# Patient Record
Sex: FEMALE | Race: WHITE | NOT HISPANIC OR LATINO | Employment: PART TIME | URBAN - METROPOLITAN AREA
[De-identification: names, ages, dates, MRNs, and addresses within clinical notes are randomized per-mention and may not be internally consistent; named-entity substitution may affect disease eponyms.]

---

## 2017-08-14 ENCOUNTER — HISTORICAL (OUTPATIENT)
Dept: RADIOLOGY | Facility: HOSPITAL | Age: 52
End: 2017-08-14

## 2017-08-25 ENCOUNTER — HISTORICAL (OUTPATIENT)
Dept: ADMINISTRATIVE | Facility: HOSPITAL | Age: 52
End: 2017-08-25

## 2017-08-25 LAB
APPEARANCE, UA: CLEAR
BACTERIA #/AREA URNS AUTO: ABNORMAL /[HPF]
BILIRUB UR QL STRIP: NEGATIVE
COLOR UR: YELLOW
GLUCOSE (UA): NORMAL
HGB UR QL STRIP: 0.06 MG/DL
HYALINE CASTS #/AREA URNS LPF: ABNORMAL /[LPF]
KETONES UR QL STRIP: NEGATIVE
LEUKOCYTE ESTERASE UR QL STRIP: NEGATIVE
NITRITE UR QL STRIP: NEGATIVE
PH UR STRIP: 6.5 [PH] (ref 4.5–8)
PROT UR QL STRIP: 30 MG/DL
RBC #/AREA URNS AUTO: ABNORMAL /[HPF]
SP GR UR STRIP: 1.02 (ref 1–1.03)
SQUAMOUS #/AREA URNS LPF: ABNORMAL /[LPF]
UROBILINOGEN UR STRIP-ACNC: 2 MG/DL
WBC #/AREA URNS AUTO: ABNORMAL /HPF

## 2017-08-27 LAB — FINAL CULTURE: NO GROWTH

## 2017-09-01 ENCOUNTER — HISTORICAL (OUTPATIENT)
Dept: ADMINISTRATIVE | Facility: HOSPITAL | Age: 52
End: 2017-09-01

## 2018-09-04 ENCOUNTER — HISTORICAL (OUTPATIENT)
Dept: WOUND CARE | Facility: HOSPITAL | Age: 53
End: 2018-09-04

## 2019-06-28 ENCOUNTER — HISTORICAL (OUTPATIENT)
Dept: WOUND CARE | Facility: HOSPITAL | Age: 54
End: 2019-06-28

## 2019-07-23 ENCOUNTER — HISTORICAL (OUTPATIENT)
Dept: RADIOLOGY | Facility: HOSPITAL | Age: 54
End: 2019-07-23

## 2019-07-30 ENCOUNTER — HISTORICAL (OUTPATIENT)
Dept: RADIOLOGY | Facility: HOSPITAL | Age: 54
End: 2019-07-30

## 2020-04-14 ENCOUNTER — HISTORICAL (OUTPATIENT)
Dept: INTERNAL MEDICINE | Facility: CLINIC | Age: 55
End: 2020-04-14

## 2020-09-22 ENCOUNTER — HISTORICAL (OUTPATIENT)
Dept: RADIOLOGY | Facility: HOSPITAL | Age: 55
End: 2020-09-22

## 2020-12-04 ENCOUNTER — HISTORICAL (OUTPATIENT)
Dept: ADMINISTRATIVE | Facility: HOSPITAL | Age: 55
End: 2020-12-04

## 2020-12-10 ENCOUNTER — HISTORICAL (OUTPATIENT)
Dept: ADMINISTRATIVE | Facility: HOSPITAL | Age: 55
End: 2020-12-10

## 2021-03-29 LAB — CRC RECOMMENDATION EXT: NORMAL

## 2021-09-13 ENCOUNTER — HISTORICAL (OUTPATIENT)
Dept: ADMINISTRATIVE | Facility: HOSPITAL | Age: 56
End: 2021-09-13

## 2021-09-28 ENCOUNTER — HISTORICAL (OUTPATIENT)
Dept: RADIOLOGY | Facility: HOSPITAL | Age: 56
End: 2021-09-28

## 2021-10-07 ENCOUNTER — HISTORICAL (OUTPATIENT)
Dept: LAB | Facility: HOSPITAL | Age: 56
End: 2021-10-07

## 2021-10-07 LAB
ANTINUCLEAR ANTIBODY SCREEN (OHS): NEGATIVE
CRP SERPL-MCNC: 0.49 MG/DL
DSDNA ANTIBODY (OHS): NEGATIVE
ERYTHROCYTE [SEDIMENTATION RATE] IN BLOOD: 7 MM/HR (ref 0–20)

## 2021-10-13 ENCOUNTER — HISTORICAL (OUTPATIENT)
Dept: RADIOLOGY | Facility: HOSPITAL | Age: 56
End: 2021-10-13

## 2021-11-09 ENCOUNTER — HISTORICAL (OUTPATIENT)
Dept: INTERNAL MEDICINE | Facility: CLINIC | Age: 56
End: 2021-11-09

## 2021-11-09 LAB
ABS NEUT (OLG): 2.46 X10(3)/MCL (ref 2.1–9.2)
ALBUMIN SERPL-MCNC: 4.1 GM/DL (ref 3.5–5)
ALBUMIN/GLOB SERPL: 1.4 RATIO (ref 1.1–2)
ALP SERPL-CCNC: 55 UNIT/L (ref 40–150)
ALT SERPL-CCNC: 21 UNIT/L (ref 0–55)
AST SERPL-CCNC: 23 UNIT/L (ref 5–34)
BASOPHILS # BLD AUTO: 0.1 X10(3)/MCL (ref 0–0.2)
BASOPHILS NFR BLD AUTO: 1 %
BILIRUB SERPL-MCNC: 0.8 MG/DL
BILIRUBIN DIRECT+TOT PNL SERPL-MCNC: 0.3 MG/DL (ref 0–0.5)
BILIRUBIN DIRECT+TOT PNL SERPL-MCNC: 0.5 MG/DL (ref 0–0.8)
BUN SERPL-MCNC: 17.5 MG/DL (ref 9.8–20.1)
CALCIUM SERPL-MCNC: 9.8 MG/DL (ref 8.7–10.5)
CHLORIDE SERPL-SCNC: 107 MMOL/L (ref 98–107)
CO2 SERPL-SCNC: 23 MMOL/L (ref 22–29)
CREAT SERPL-MCNC: 0.69 MG/DL (ref 0.55–1.02)
DEPRECATED CALCIDIOL+CALCIFEROL SERPL-MC: 25.7 NG/ML (ref 30–80)
EOSINOPHIL # BLD AUTO: 0.2 X10(3)/MCL (ref 0–0.9)
EOSINOPHIL NFR BLD AUTO: 3 %
ERYTHROCYTE [DISTWIDTH] IN BLOOD BY AUTOMATED COUNT: 12.3 % (ref 11.5–14.5)
GLOBULIN SER-MCNC: 3 GM/DL (ref 2.4–3.5)
GLUCOSE SERPL-MCNC: 94 MG/DL (ref 74–100)
HCT VFR BLD AUTO: 40.9 % (ref 35–46)
HGB BLD-MCNC: 13.6 GM/DL (ref 12–16)
IMM GRANULOCYTES # BLD AUTO: 0.02 10*3/UL
IMM GRANULOCYTES NFR BLD AUTO: 0 %
LYMPHOCYTES # BLD AUTO: 3 X10(3)/MCL (ref 0.6–4.6)
LYMPHOCYTES NFR BLD AUTO: 49 %
MCH RBC QN AUTO: 30.8 PG (ref 26–34)
MCHC RBC AUTO-ENTMCNC: 33.3 GM/DL (ref 31–37)
MCV RBC AUTO: 92.5 FL (ref 80–100)
MONOCYTES # BLD AUTO: 0.4 X10(3)/MCL (ref 0.1–1.3)
MONOCYTES NFR BLD AUTO: 7 %
NEUTROPHILS # BLD AUTO: 2.46 X10(3)/MCL (ref 2.1–9.2)
NEUTROPHILS NFR BLD AUTO: 40 %
NRBC BLD AUTO-RTO: 0 % (ref 0–0.2)
PLATELET # BLD AUTO: 209 X10(3)/MCL (ref 130–400)
PMV BLD AUTO: 10.3 FL (ref 7.4–10.4)
POTASSIUM SERPL-SCNC: 3.8 MMOL/L (ref 3.5–5.1)
PROT SERPL-MCNC: 7.1 GM/DL (ref 6.4–8.3)
RBC # BLD AUTO: 4.42 X10(6)/MCL (ref 4–5.2)
SODIUM SERPL-SCNC: 140 MMOL/L (ref 136–145)
WBC # SPEC AUTO: 6.1 X10(3)/MCL (ref 4.5–11)

## 2022-02-15 ENCOUNTER — HISTORICAL (OUTPATIENT)
Dept: INTERNAL MEDICINE | Facility: CLINIC | Age: 57
End: 2022-02-15

## 2022-02-15 LAB — DEPRECATED CALCIDIOL+CALCIFEROL SERPL-MC: 30.7 NG/ML (ref 30–80)

## 2022-04-09 ENCOUNTER — HISTORICAL (OUTPATIENT)
Dept: ADMINISTRATIVE | Facility: HOSPITAL | Age: 57
End: 2022-04-09
Payer: MEDICAID

## 2022-04-25 VITALS
BODY MASS INDEX: 30.96 KG/M2 | OXYGEN SATURATION: 99 % | WEIGHT: 164 LBS | HEIGHT: 61 IN | SYSTOLIC BLOOD PRESSURE: 117 MMHG | DIASTOLIC BLOOD PRESSURE: 82 MMHG

## 2022-05-02 NOTE — HISTORICAL [facility] CERNER
This is a historical note converted from Albert. Formatting and pictures may have been removed.  Please reference Albert for original formatting and attached multimedia. Chief Complaint  c/o pelvic pain and dysuria  History of Present Illness  Pt is a 53yo here today with c/o pelvic pain. She reports that soon after getting the pelvic u/s on 8/14/17 she began having dysuria, vaginal itching, and lower abdominal pain. She called the clinic on 8/18/17 and was prescribed diflucan for vaginal itching. She reports improvement in dysuria and vaginal itching. She reports urinary frequency and yellow/green vaginal discharge. She also reports unprotected sex around the same time as the u/s.  Review of Systems  Constitutional: No fever, No chills, No sweats, No fatigue.  Gastrointestinal: Positive for lower abdominal pain. No nausea, No vomiting, No abdominal distention, No change in bowel habits.  Genitourinary:?Positive for frequency and?dysuria, No hematuria.  Gynecologic: Positive for vaginal itching and discharge.  Neurologic: Alert and oriented X4, No confusion.  ?  Physical Exam  Vitals & Measurements  T:?36.8? ?C ?(Oral)? HR:?77?(Peripheral)? RR:?18? BP:?116/79?  HT:?154?cm? HT:?154?cm? WT:?75?kg? WT:?75?kg? BMI:?31.62?  General: Alert and oriented, No acute distress.  Gastrointestinal: Soft, lower abdominal tenderness.  Gynecology:  Labia: Bilateral, Majora, Minora, Within normal limits.  Vagina: Within normal limits.  Cervix: scant yellow discharge from os. No cervical motion tenderness, No lesions.  Uterus: Mobile.  Ovaries: No palpable?mass.  Integumentary: Warm, Dry.  Psychiatric: Cooperative, Appropriate mood & affect.  ?  Assessment/Plan  1.?Dysuria  ? - UA  - drink plenty of water  Ordered:  Office/Outpatient Visit Level 3 Established 61780 PC  Urinalysis with Microscopic if Indicated  Urine Culture 04172  Urine Dipstick POC  ?  2.?Pelvic pain  ?-UA  - GC  - wet prep  - naproxen BID PRN  pain  Ordered:  naproxen, 500 mg = 1 tab(s), Oral, BID, # 30 tab(s), 0 Refill(s), Pharmacy: [id number]  Clinic Follow-up PRN  Office/Outpatient Visit Level 3 Established 13396 PC  Urinalysis with Microscopic if Indicated  Urine Culture [id number]  Urine Dipstick POC  ?  3.?Screening for STD (sexually transmitted disease)  ? Declines HIV/hepatitis/syphilis screening.  Ordered:  Chlamydia trach and N. gonorrhea PCR  Office/Outpatient Visit Level 3 Established 78767 PC  Wet Prep Smear  ?   Problem List/Past Medical History  Ongoing  Obesity  Historical  Procedure/Surgical History   delivery ()  None  Medications  naproxen 500 mg oral tablet, 500 mg= 1 tab(s), Oral, BID  Allergies  No Known Allergies  Social History  Alcohol - Denies Alcohol Use, 03/15/2017  Never  Employment/School - 03/15/2017  Work/School description: homemaker. Highest education level: Some college. Operates hazardous equipment: No.  Exercise - 03/15/2017  Self assessment: Good condition.  Home/Environment - 03/15/2017  Lives with daughter lives with her. Living situation: Home/Independent. Alcohol abuse in household: No. Substance abuse in household: No. Smoker in household: No. Injuries/Abuse/Neglect in household: No. Feels unsafe at home: No. Family/Friends available for support: Yes. Concern for family members at home: No. Major illness in household: No. Financial concerns: No. TV/Computer concerns: No.  Nutrition/Health - 03/15/2017  Regular  Substance Abuse - 03/15/2017  Never  Tobacco - Denies Tobacco Use, 03/15/2017  Never smoker  Family History  Lung cancer: Father.

## 2022-05-02 NOTE — HISTORICAL [facility] CERNER
This is a historical note converted from Albert. Formatting and pictures may have been removed.  Please reference Albert for original formatting and attached multimedia. Chief Complaint  F/U- Numbness in legs worsen- Right worse 8/10  History of Present Illness  PMHx: vasomotor symptoms, HLD  ?   This is a 56-year-old  female who presents?for a f/u visit.? LV 12/2020. Patient reports overall doing well except for worsening lower back pain that is radiating in to bilateral buttocks extending to posterior thigh and back of knees.??Reports?pain is worse with certain positional movements, pain is especially worse with sitting and relieved with standing and walking around. At her last visit, she was given Mobic which she reports no relief.?Also?tried gabapentin which?she states?helps but it is not lasting until her next dose.?States?the pain and?tingling do not occur all the time, intermittent. No alleviating factors. No saddle anesthesia, no urinary or bowel incontinence. no other numbness/tingling in any of her other extremities. 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 She also is no longer taking HRT since July 2021.  ?   Denies any neuro changes, ulcers/skin changes, chest pain, sob, n/v abdominal pain. No fevers or chills.? Followed by GYN for dysuria and vasomotor symptoms.?  No changes in diet or activity. ?Reports good energy levels.?  Review of Systems  12 point ROS was performed and?all negative except as stated in the HPI.  Physical Exam  Vitals & Measurements  T:?36.7? ?C (Oral)? HR:?70(Peripheral)? RR:?18? BP:?123/86?  HT:?154.00?cm? WT:?74.900?kg? BMI:?31.58?  General: well-nourished, well-developed in no acute distress  HEENT: Atraumatic, normocephalic.  Neck: No JVD or carotid bruits. No lymphadenopathy.  Heart: RRR, no murmurs, gallops, clicks or rubs. S1, S2 present.?  Lungs: CTAB without rales, wheezes or  rhonchi. Normal work of breathing. Chest rise symmetrical on inspiration.  Abd: Soft, non-tender, non-distended and without guarding. Bowel sounds present.  Extremities: Radial and pedal pulses 2+ bilaterally, no LE edema. Homans sign negative bilaterally  MSK: Moves all extremities purposefully. Straight leg raise positive on the right. Spine and b/l hip non-tender to palpation  Skin: Warm, dry and without rashes.  Neuro: AA&Ox3, no FND, motor strength 5/5 bilaterally  Assessment/Plan  Hx of vasomotor symptoms, no longer on HRT  followed by GYN, last seen by GYN?on 7/2021 - was taken off of all HRT  MG ordered per GYN  ?   Hypercholesterolemia  Repeat Lipid panel 06/2021, , HDL 63  ASCVD risk 1.7% no statin therapy recommended at this time  Continue 6 month surveillance - ordered repeat lipid panel  ?   Chronic lower lumbar radiculopathy  Sciatic nerve pain  - XR lumbar spine done 12/2020 no listhesis, Possible L5-S1 facet overgrowth. Lumbar disc spaces are preserved.  - stopped Mobic given no relief, failed conservative management with stretching/physical therapy, anti-inflammatories and gabapentin  - given persistent and worsening symptoms of lower lumbar pain with radiation to b/l buttocks and posterior thigh  - D-dimer negative at last visit, less concern for VTE  -Ordered MRI lumbar spine, XR pelvis along to evaluate SI joint, ordered ESR, CRP, SORAYA, HLA-B27 to rule out sacroilitis/ankylosing spondylitis  - increased gabapentin to 300mg TID and added Robaxin 750mg BID prn mucsle spasm in the interim  -Sent referral to neurosurgery in [facility]  ?   Health Maintenance:  TDAP: UTD  Zoster: dose #1/2 given today  HIV/Hep screening: negative on 12/2020  CAGE screening:?done ?8.14.20  Influenza vaccine: N/A at this time  Screening colonoscopy: normal colonoscopy on 3/29/2021  Pap smear:?NIL in 2019 (repeat due 07/2022)  Mammogram: Done in July 2019 with?recommended?follow-up with ultrasound?which was also  done in 2019 and was negative for any breast?pathology. MG ordered per GYN  A1C 5.2% in Dec 2020  ?  ?   Labs: ESR, CRP, SORAYA, HLA-B27  Imaging: MRI lumbar spine, xr pelvis  Medications: reconciled, discussed.  RTC in?2 months  Referrals  Ambulatory Referral, Specialty: Neurosurgery, Reason: Lumbar radiculopathy worsening x 2.5 years, no improvement with conservative management, Refer To: Provider Not Specified, [facility], [address], [address], [place], [address]., Start: ...  Clinic Follow up, *Est. 21 7:50:00 CST, Order for future visit, Hypertriglyceridemia, [facility]   Problem List/Past Medical History  Ongoing  Hot flashes due to menopause  Hypertriglyceridemia  Obesity  Historical  Pregnant  Pregnant  Procedure/Surgical History  c/s ()   delivery ()   Medications  gabapentin 100 mg oral capsule, 300 mg= 3 cap(s), Oral, TID, 2 refills  Robaxin 750 mg oral tablet, 750 mg= 1 tab(s), Oral, BID, 1 refills  Allergies  No Known Allergies  Social History  Abuse/Neglect  No, No, Yes, 2021  Alcohol - Denies Alcohol Use, 10/26/2014  Current, Wine, 1-2 times per week, Alcohol use interferes with work or home: No. Others hurt by drinking: No. Household alcohol concerns: No., 2021  Employment/School  Employed, Highest education level: High school., 2021  Exercise  Exercise duration: 30. Exercise frequency: Daily. Exercise type: Walking., 12/10/2019  Home/Environment  Lives with Alone. Living situation: Home/Independent., 12/10/2019  Nutrition/Health  Good, 2021  Sexual  Sexually active: Yes. Number of current partners 1. Sexual orientation: Straight or heterosexual. Gender Identity Identifies as female., 2019  Spiritual/Cultural  Judaism, Yes, 2021  Substance Use  Never, 2017  Tobacco - Denies Tobacco Use, 10/26/2014  Never (less than 100 in lifetime), N/A, 2021  Family History  Lung cancer:  Father.  Immunizations  Vaccine Date Status Comments   influenza virus vaccine, inactivated 12/10/2019 Given    tetanus/diphtheria/pertussis, acel(Tdap) 08/14/2018 Given    influenza virus vaccine, inactivated 03/15/2017 Given    influenza virus vaccine, inactivated 09/15/2013 Recorded    influenza virus vaccine, inactivated 11/08/2011 Recorded 2018-08-13: UNKNOWN CAMPAIGNID   Health Maintenance  Health Maintenance  ???Pending?(in the next year)  ??? ??OverDue  ??? ? ? ?Colorectal Screening due??04/14/21??and every 1??year(s)  ??? ??Due In Future?  ??? ? ? ?ADL Screening not due until??12/10/21??and every 1??year(s)  ??? ? ? ?Obesity Screening not due until??01/01/22??and every 1??year(s)  ??? ? ? ?Alcohol Misuse Screening not due until??01/02/22??and every 1??year(s)  ??? ? ? ?Cervical Cancer Screening not due until??07/07/22??and every 3??year(s)  ???Satisfied?(in the past 1 year)  ??? ??Satisfied?  ??? ? ? ?ADL Screening on??12/10/20.??Satisfied by Akua Arizmendi LPN  ??? ? ? ?Alcohol Misuse Screening on??06/21/21.??Satisfied by Ella Castellanos DO  ??? ? ? ?Aspirin Therapy for CVD Prevention on??09/13/21.??Satisfied by Ella Castellanos DO  ??? ? ? ?Blood Pressure Screening on??09/13/21.??Satisfied by Larisa Deluna LPN  ??? ? ? ?Body Mass Index Check on??09/13/21.??Satisfied by Larisa Deluna LPN  ??? ? ? ?Breast Cancer Screening on??09/22/20.??Satisfied by Katina Fong  ??? ? ? ?Depression Screening on??09/13/21.??Satisfied by Larisa Deluna LPN  ??? ? ? ?Diabetes Screening on??12/04/20.??Satisfied by Queta Campbell  ??? ? ? ?Influenza Vaccine on??12/10/20.??Satisfied by Giorgi Singleton  ??? ? ? ?Lipid Screening on??06/21/21.??Satisfied by Ashley Martin.  ??? ? ? ?Obesity Screening on??09/13/21.??Satisfied by Larisa Deluna LPN  ??? ??Refused?  ??? ? ? ?Influenza Vaccine on??12/10/20.??Recorded by Ella Castellanos DO??Reason: Patient Refuses  ??? ? ? ?Zoster Vaccine  on??06/21/21.??Recorded by Ella Castellanos DO??Reason: Patient Refuses  ?      g

## 2022-05-02 NOTE — HISTORICAL [facility] CERNER
This is a historical note converted from Albert. Formatting and pictures may have been removed.  Please reference Albert for original formatting and attached multimedia. Chief Complaint  f/u visit  History of Present Illness  PMHx: vasomotor symptoms, HLD  ?   This is a 55-year-old  female who presents?for a f/u visit.? LV 10/2020. Patient was notified a few days ago of results of labs which was significant for UA positive nitrites and leuk est.?Patient was complaining of polyuria and suprapubic pain so was given Rx for Macrobid. She has completed 2 days so far. Today she reports Itching, pain with sex, erythema that started on Monday. She denies any?discharge or foul odor. She reports that her?and her one sexual partner split up for a while and are now back together. She is concerned about possible?STDs.  ?   Denies any neuro changes, ulcers/skin changes, chest pain, sob, n/v abdominal pain. No fevers or chills. No blood in urine or stool. She was given referral for colonoscopy at last visit but has not had appt yet due to GI on diversion. Will send external referral today to [facility].  ?   Followed by GYN for dysuria and vasomotor symptoms. Last seen on 7/2020.  No changes in diet or activity. ?Reports good energy levels.?  ?   She?also reports left lower?lumbar pain?with radiation and tingling to the back of the left leg extending down to the knee for about one year now. She is requesting something for pain. States?the pain and?tingling do not occur all the time, intermittent. No alleviating or exacerbating factors. No saddle anesthesia, no urinary or bowel incontinence. no other numbness/tingling in any of her other extremities.  ?  Review of Systems  12 point ROS was performed and?all negative except as stated in the HPI.  Physical Exam  Vitals & Measurements  T:?36.9? ?C (Oral)? HR:?72(Peripheral)? RR:?20? BP:?117/79?  HT:?154.00?cm? WT:?73.400?kg? BMI:?30.95?  General: well-nourished,  well-developed in no acute distress  HEENT: Atraumatic, normocephalic.  Neck: No JVD or carotid bruits. No lymphadenopathy.  Heart: RRR, no murmurs, gallops, clicks or rubs. S1, S2 present.?  Lungs: CTAB without rales, wheezes or rhonchi. Normal work of breathing. Chest rise symmetrical on inspiration.  Abd: Soft, non-tender, non-distended and without guarding. Bowel sounds present.  : slight erythema of vulva, no rash or herptic lesions seen, no discharge, no chuy foul odor  Extremities: Radial and pedal pulses 2+ bilaterally, no LE edema.  MSK: Moves all extremities purposefully.  Skin: Warm, dry and without rashes.  Assessment/Plan  UTI  -Rx for Macrobid given 2 days ago, will complete on Saturday  - Symptoms of polyuria and suprapubic pain improving  - Now with itching, pain with sex, and erythema - exam benign (though pelvic not done due to no equipment in clinic)  - Instructed patient to call her OB/GYN to notify of symptoms and see if she can get an appt today and if NOT, urged patient to go to [facility] for pelvic exam and testing for GC/Chlamydia, BV, trichomonas then call GYN for f/u  - ordered HIV, syphilis  ?   Hx of vasomotor symptoms, on HRT  followed by GYN, last seen by GYN?on 7/10/2020  continue to f/u with them for HRT  ?   Hypercholesterolemia  Repeat Lipid panel in Dec 2020 , HDL 64, trig 98,   ASCVD risk 1.5% no statin therapy recommended at this time  Continue 6 month surveillance.  ?   Sciatic nerve pain  - ordered XR lumbar spine to rule out spondylolisthesis  - will provide Rx for Mobic and start Protonix for ulcer ppx  ?   Health Maintenance:  TDAP: UTD  CAGE screening:?done ?8.14.20  Influenza vaccine: Given Dec 2019. Refused  Screening colonoscopy: FIT positive 04/2020 - sent referral to [facility]  Pap smear: f/u with GYN, MG ordered per GYN note  Mammogram: Done in July 2019 with?recommended?follow-up with ultrasound?which was also done in  2019 and was negative for any breast?pathology. MG ordered per GYN  A1C 5.2% in Dec 2020  ?  ?  Labs today: HIV, syphilis, acute hep panel  Imaging: XR lumbar spine  Medications: reconciled, discussed.  RTC in?3 months  Referrals  Ambulatory Referral, Specialty: Gastroenterology, Reason: Colonoscopy, Refer To: [facility], [facility], [facility], [address] [facility] [address] [place], [address]., Start: 12/10/20 8:14:00 CST, Colon cancer screening  Clinic Follow up, *Est. 21 9:15:00 CDT, Order for future visit, Hot flashes due to menopause, [facility]  Clinic Follow up, *Est. 21 9:15:00 CDT, Order for future visit, Hot flashes due to menopause, [facility]   Problem List/Past Medical History  Ongoing  Hot flashes due to menopause  Hypertriglyceridemia  Obesity  Historical  Pregnant  Pregnant  Procedure/Surgical History  c/s ()   delivery ()   Medications  estradiol 0.5 mg oral tablet, See Instructions, 6 refills,? ?Not taking: Duplicate  estradiol 0.5 mg oral tablet, 0.5 mg= 1 tab(s), Oral, Every other day, 6 refills  Macrobid 100 mg oral capsule, 100 mg= 1 cap(s), Oral, BID,? ?Still taking, not as prescribed: pt states  Mobic 7.5 mg oral tablet, 7.5 mg= 1 tab(s), Oral, Daily, PRN, 1 refills  Protonix 40 mg ORAL enteric coated tablet, 40 mg= 1 tab(s), Oral, Daily, 1 refills  Provera 2.5 mg oral tablet, See Instructions, 6 refills,? ?Not taking: Duplicate  Provera 2.5 mg oral tablet, 2.5 mg= 1 tab(s), Oral, Every other day, 6 refills  Allergies  No Known Allergies  Social History  Abuse/Neglect  No, No, Yes, 10/14/2020  No, 12/10/2019  Alcohol - Denies Alcohol Use, 10/26/2014  Current, Beer, Wine, 12/10/2019  Employment/School  Employed, 12/10/2019  Exercise  Exercise duration: 30. Exercise frequency: Daily. Exercise type: Walking., 12/10/2019  Home/Environment  Lives with Alone. Living situation: Home/Independent.,  12/10/2019  Nutrition/Health  Regular, 12/10/2019  Sexual  Sexually active: Yes. Number of current partners 1. Sexual orientation: Straight or heterosexual. Gender Identity Identifies as female., 07/08/2019  Spiritual/Cultural  [place], 12/10/2019  Substance Use  Never, 02/23/2017  Tobacco - Denies Tobacco Use, 10/26/2014  Never (less than 100 in lifetime), N/A, 10/14/2020  Never (less than 100 in lifetime), N/A, 12/10/2019  Family History  Lung cancer: Father.  Immunizations  Vaccine Date Status Comments   influenza virus vaccine, inactivated 12/10/2019 Given    tetanus/diphtheria/pertussis, acel(Tdap) 08/14/2018 Given    influenza virus vaccine, inactivated 03/15/2017 Given    influenza virus vaccine, inactivated 09/15/2013 Recorded    influenza virus vaccine, inactivated 11/08/2011 Recorded 2018-08-13: UNKNOWN CAMPAIGNID   Health Maintenance  Health Maintenance  ???Pending?(in the next year)  ??? ??OverDue  ??? ? ? ?Influenza Vaccine due??10/01/20??and every 1??day(s)  ??? ??Due?  ??? ? ? ?Zoster Vaccine due??12/10/20??Unknown Frequency  ??? ??Due In Future?  ??? ? ? ?Obesity Screening not due until??01/01/21??and every 1??year(s)  ??? ? ? ?Alcohol Misuse Screening not due until??01/02/21??and every 1??year(s)  ??? ? ? ?Colorectal Screening not due until??04/14/21??and every 1??year(s)  ??? ? ? ?Blood Pressure Screening not due until??07/10/21??and every 1??year(s)  ??? ? ? ?Body Mass Index Check not due until??07/10/21??and every 1??year(s)  ??? ? ? ?Depression Screening not due until??10/14/21??and every 1??year(s)  ??? ? ? ?ADL Screening not due until??10/14/21??and every 1??year(s)  ???Satisfied?(in the past 1 year)  ??? ??Satisfied?  ??? ? ? ?ADL Screening on??10/14/20.??Satisfied by Ella Castellanos DO  ??? ? ? ?Alcohol Misuse Screening on??10/14/20.??Satisfied by Ella Castellanos DO  ??? ? ? ?Blood Pressure Screening on??07/10/20.??Satisfied by Albina Davies LPN  ??? ? ? ?Body Mass Index Check  on??07/10/20.??Satisfied by Albina Davies LPN  ??? ? ? ?Breast Cancer Screening on??09/22/20.??Satisfied by Katina Fong  ??? ? ? ?Colorectal Screening on??04/14/20.??Satisfied by Aysha Corea  ??? ? ? ?Depression Screening on??10/14/20.??Satisfied by Liset Mccollum  ??? ? ? ?Diabetes Screening on??12/04/20.??Satisfied by Queta Campbell.  ??? ? ? ?Lipid Screening on??12/04/20.??Satisfied by Queta Campbell.  ??? ? ? ?Obesity Screening on??07/10/20.??Satisfied by Albina Davies LPN  ?      I have reviewed the patients history, residents? findings on physical examination, diagnosis and treatment plan. Care provided was reasonable and necessary.

## 2022-07-14 ENCOUNTER — OFFICE VISIT (OUTPATIENT)
Dept: GYNECOLOGY | Facility: CLINIC | Age: 57
End: 2022-07-14
Payer: MEDICAID

## 2022-07-14 VITALS
TEMPERATURE: 98 F | HEART RATE: 73 BPM | HEIGHT: 60 IN | BODY MASS INDEX: 33.77 KG/M2 | RESPIRATION RATE: 18 BRPM | WEIGHT: 172 LBS | SYSTOLIC BLOOD PRESSURE: 106 MMHG | OXYGEN SATURATION: 97 % | DIASTOLIC BLOOD PRESSURE: 74 MMHG

## 2022-07-14 DIAGNOSIS — R23.2 HOT FLASHES: ICD-10-CM

## 2022-07-14 DIAGNOSIS — N89.8 VAGINAL DISCHARGE: ICD-10-CM

## 2022-07-14 DIAGNOSIS — Z12.4 PAP SMEAR FOR CERVICAL CANCER SCREENING: Primary | ICD-10-CM

## 2022-07-14 DIAGNOSIS — Z12.31 VISIT FOR SCREENING MAMMOGRAM: ICD-10-CM

## 2022-07-14 LAB
CLUE CELLS VAG QL WET PREP: ABNORMAL
T VAGINALIS VAG QL WET PREP: ABNORMAL
WBC #/AREA VAG WET PREP: ABNORMAL
YEAST SPEC QL WET PREP: ABNORMAL

## 2022-07-14 PROCEDURE — 3008F BODY MASS INDEX DOCD: CPT | Mod: CPTII,,, | Performed by: NURSE PRACTITIONER

## 2022-07-14 PROCEDURE — 87624 HPV HI-RISK TYP POOLED RSLT: CPT | Performed by: NURSE PRACTITIONER

## 2022-07-14 PROCEDURE — 3008F PR BODY MASS INDEX (BMI) DOCUMENTED: ICD-10-PCS | Mod: CPTII,,, | Performed by: NURSE PRACTITIONER

## 2022-07-14 PROCEDURE — 99396 PR PREVENTIVE VISIT,EST,40-64: ICD-10-PCS | Mod: S$PBB,,, | Performed by: NURSE PRACTITIONER

## 2022-07-14 PROCEDURE — 3078F PR MOST RECENT DIASTOLIC BLOOD PRESSURE < 80 MM HG: ICD-10-PCS | Mod: CPTII,,, | Performed by: NURSE PRACTITIONER

## 2022-07-14 PROCEDURE — 1160F PR REVIEW ALL MEDS BY PRESCRIBER/CLIN PHARMACIST DOCUMENTED: ICD-10-PCS | Mod: CPTII,,, | Performed by: NURSE PRACTITIONER

## 2022-07-14 PROCEDURE — 99214 OFFICE O/P EST MOD 30 MIN: CPT | Mod: PBBFAC | Performed by: NURSE PRACTITIONER

## 2022-07-14 PROCEDURE — 1160F RVW MEDS BY RX/DR IN RCRD: CPT | Mod: CPTII,,, | Performed by: NURSE PRACTITIONER

## 2022-07-14 PROCEDURE — 3074F PR MOST RECENT SYSTOLIC BLOOD PRESSURE < 130 MM HG: ICD-10-PCS | Mod: CPTII,,, | Performed by: NURSE PRACTITIONER

## 2022-07-14 PROCEDURE — 1159F MED LIST DOCD IN RCRD: CPT | Mod: CPTII,,, | Performed by: NURSE PRACTITIONER

## 2022-07-14 PROCEDURE — 3074F SYST BP LT 130 MM HG: CPT | Mod: CPTII,,, | Performed by: NURSE PRACTITIONER

## 2022-07-14 PROCEDURE — 1159F PR MEDICATION LIST DOCUMENTED IN MEDICAL RECORD: ICD-10-PCS | Mod: CPTII,,, | Performed by: NURSE PRACTITIONER

## 2022-07-14 PROCEDURE — 3078F DIAST BP <80 MM HG: CPT | Mod: CPTII,,, | Performed by: NURSE PRACTITIONER

## 2022-07-14 PROCEDURE — 99396 PREV VISIT EST AGE 40-64: CPT | Mod: S$PBB,,, | Performed by: NURSE PRACTITIONER

## 2022-07-14 PROCEDURE — 87210 SMEAR WET MOUNT SALINE/INK: CPT | Performed by: NURSE PRACTITIONER

## 2022-07-14 RX ORDER — MEDROXYPROGESTERONE ACETATE 2.5 MG/1
2.5 TABLET ORAL DAILY
Qty: 30 TABLET | Refills: 12 | Status: SHIPPED | OUTPATIENT
Start: 2022-07-14 | End: 2023-07-17 | Stop reason: SDUPTHER

## 2022-07-14 RX ORDER — ESTRADIOL 0.5 MG/1
0.5 TABLET ORAL DAILY
Qty: 30 TABLET | Refills: 12 | Status: SHIPPED | OUTPATIENT
Start: 2022-07-14 | End: 2023-07-17 | Stop reason: SDUPTHER

## 2022-07-14 NOTE — PROGRESS NOTES
Subjective:       Patient ID: An Ly is a 57 y.o. female.    Chief Complaint:  Well Woman    History of Present Illness  Pt is  here for annual GYN exam. Pt has been post menopausal since age 46yo. Last pap-2019-NIL and HPV neg. Denies any abn. paps in the past. Denies discharge, itching or bleeding. Denies abd. or pelvic pain. Denies breast or urinary complaints. MG-10/13/21-BIRADS 1. Pt has hx c/o bothersome hot flashes, night sweats, and mood swings in the past. Originally she was placed on Activella and was doing well, but had to switch due to insurance reasons. She was switched to Prempro and stopped Prempro after about about 2 months due to breast pain. She was being treated with estradiol and provera every other day, with multiple attempts to stop over the last few years. She has stopped over  as she was not experiencing hot flashes. Since this time, pt has started experiencing hot flashes again, states 4-5x/day occurring daily. She also c/o vaginal dryness, attempted Premarin cream however does not feel that it helped.  She currently declines use of SSRI/SNRI for treatment. She denies any medical hx, no hx of HTN, DVT, PE, CP, SOB, MI or CVA or breast cancer. She is a non smoker. HLD being monitored by PCP. Denies fly hx of breast, ovarian, uterine or colon cancer. FIT positive in -colonoscopy which only showed diverticulosis    GYN & OB History  No LMP recorded. Patient is postmenopausal.     Review of patient's allergies indicates:  No Known Allergies  Past Medical History:   Diagnosis Date    Arthritis      OB History    Para Term  AB Living   2 2           SAB IAB Ectopic Multiple Live Births                  # Outcome Date GA Lbr Noam/2nd Weight Sex Delivery Anes PTL Lv   2 Para            1 Para                 Review of Systems  Review of Systems    Negative except for pertinent findings for positives per HPI     Objective:    Physical Exam    /74 (BP  Location: Right arm, Patient Position: Sitting, BP Method: Large (Automatic))   Pulse 73   Temp 97.7 °F (36.5 °C)   Resp 18   Ht 5' (1.524 m)   Wt 78 kg (172 lb)   SpO2 97%   BMI 33.59 kg/m²   GENERAL: Well-developed female in no acute distress.  SKIN: Normal to inspection, warm and intact.  BREASTS: No masses, lumps, discharge, tenderness.  VULVA: General appearance normal; external genitalia with no lesions or erythema.  VAGINA: Mucosa normal, pink, no discharge or lesions.  CERVIX: Grossly normal, pink, no erythema or discharge. Stenotic.  BIMANUAL EXAM: reveals a 8 week-sized uterus. The uterus is non tender. Alex adnexa reveal no evidence of masses, tenderness.  PSYCHIATRIC: Patient is oriented to person, place, and time. Mood and affect are normal.    Assessment:       1. Pap smear for cervical cancer screening    2. Vaginal discharge    3. Visit for screening mammogram    4. Hot flashes       Plan:   An was seen today for well woman.    Diagnoses and all orders for this visit:    Pap smear for cervical cancer screening  -     Liquid-Based Pap Smear, Screening Screening    Vaginal discharge  -     Wet Prep, Genital    Visit for screening mammogram  -     Mammo Digital Screening Bilat w/ Bi; Future    Hot flashes  -     estradioL (ESTRACE) 0.5 MG tablet; Take 1 tablet (0.5 mg total) by mouth once daily.  -     medroxyPROGESTERone (PROVERA) 2.5 MG tablet; Take 1 tablet (2.5 mg total) by mouth once daily.    Pap today.  MMG ordered  Wet prep  Restart Estradiol and Provera for hot flashes/vaginal dryness.  Follow up in about 1 year (around 7/14/2023) for annual exam.

## 2022-07-22 LAB
INSULIN SERPL-ACNC: NORMAL U[IU]/ML
LAB AP BETHESDA CATEGORY: NORMAL
LAB AP CLINICAL FINDINGS: NORMAL
LAB AP CONTRACEPTIVES: NORMAL
LAB AP GYN MOLECULAR TESTING: NORMAL
LAB AP LMP DATE: NORMAL
LAB AP OCHS PAP SPECIMEN ADEQUACY: NORMAL
LAB AP OHS PAP INTERPRETATION: NORMAL
LAB AP PAP DISCLAIMER COMMENTS: NORMAL
LAB AP PAP ESTROGEN REPLACEMENT THERAPY: NORMAL
LAB AP PAP PMP: NORMAL
LAB AP PAP PREVIOUS BX: NORMAL
LAB AP PAP PRIOR TREATMENT: NORMAL

## 2022-07-26 LAB — HPV16+18+H RISK 12 DNA CVX-IMP: NEGATIVE

## 2022-10-05 ENCOUNTER — HOSPITAL ENCOUNTER (EMERGENCY)
Facility: HOSPITAL | Age: 57
Discharge: HOME | End: 2022-10-05
Attending: FAMILY MEDICINE
Payer: MEDICAID

## 2022-10-05 VITALS
DIASTOLIC BLOOD PRESSURE: 78 MMHG | BODY MASS INDEX: 33.93 KG/M2 | WEIGHT: 172.81 LBS | HEART RATE: 76 BPM | HEIGHT: 60 IN | RESPIRATION RATE: 16 BRPM | SYSTOLIC BLOOD PRESSURE: 122 MMHG | OXYGEN SATURATION: 98 % | TEMPERATURE: 98 F

## 2022-10-05 DIAGNOSIS — R10.9 LEFT FLANK PAIN: Primary | ICD-10-CM

## 2022-10-05 LAB
ALBUMIN SERPL-MCNC: 4.3 GM/DL (ref 3.5–5)
ALBUMIN/GLOB SERPL: 1.5 RATIO (ref 1.1–2)
ALP SERPL-CCNC: 70 UNIT/L (ref 40–150)
ALT SERPL-CCNC: 17 UNIT/L (ref 0–55)
APPEARANCE UR: CLEAR
AST SERPL-CCNC: 20 UNIT/L (ref 5–34)
BACTERIA #/AREA URNS AUTO: ABNORMAL /HPF
BASOPHILS # BLD AUTO: 0.07 X10(3)/MCL (ref 0–0.2)
BASOPHILS NFR BLD AUTO: 0.7 %
BILIRUB UR QL STRIP.AUTO: NEGATIVE MG/DL
BILIRUBIN DIRECT+TOT PNL SERPL-MCNC: 0.9 MG/DL
BUN SERPL-MCNC: 18.7 MG/DL (ref 9.8–20.1)
CALCIUM SERPL-MCNC: 9.5 MG/DL (ref 8.4–10.2)
CHLORIDE SERPL-SCNC: 104 MMOL/L (ref 98–107)
CO2 SERPL-SCNC: 25 MMOL/L (ref 22–29)
COLOR UR AUTO: YELLOW
CREAT SERPL-MCNC: 0.93 MG/DL (ref 0.55–1.02)
EOSINOPHIL # BLD AUTO: 0.18 X10(3)/MCL (ref 0–0.9)
EOSINOPHIL NFR BLD AUTO: 1.8 %
ERYTHROCYTE [DISTWIDTH] IN BLOOD BY AUTOMATED COUNT: 11.9 % (ref 11.5–17)
GFR SERPLBLD CREATININE-BSD FMLA CKD-EPI: >60 MLS/MIN/1.73/M2
GLOBULIN SER-MCNC: 2.8 GM/DL (ref 2.4–3.5)
GLUCOSE SERPL-MCNC: 115 MG/DL (ref 74–100)
GLUCOSE UR QL STRIP.AUTO: NORMAL MG/DL
HCT VFR BLD AUTO: 44.3 % (ref 37–47)
HGB BLD-MCNC: 14.4 GM/DL (ref 12–16)
HYALINE CASTS #/AREA URNS LPF: ABNORMAL /LPF
IMM GRANULOCYTES # BLD AUTO: 0.03 X10(3)/MCL (ref 0–0.04)
IMM GRANULOCYTES NFR BLD AUTO: 0.3 %
KETONES UR QL STRIP.AUTO: NEGATIVE MG/DL
LEUKOCYTE ESTERASE UR QL STRIP.AUTO: NEGATIVE UNIT/L
LYMPHOCYTES # BLD AUTO: 3.67 X10(3)/MCL (ref 0.6–4.6)
LYMPHOCYTES NFR BLD AUTO: 36.4 %
MCH RBC QN AUTO: 30.4 PG (ref 27–31)
MCHC RBC AUTO-ENTMCNC: 32.5 MG/DL (ref 33–36)
MCV RBC AUTO: 93.5 FL (ref 80–94)
MONOCYTES # BLD AUTO: 0.67 X10(3)/MCL (ref 0.1–1.3)
MONOCYTES NFR BLD AUTO: 6.6 %
MUCOUS THREADS URNS QL MICRO: ABNORMAL /LPF
NEUTROPHILS # BLD AUTO: 5.5 X10(3)/MCL (ref 2.1–9.2)
NEUTROPHILS NFR BLD AUTO: 54.2 %
NITRITE UR QL STRIP.AUTO: NEGATIVE
NRBC BLD AUTO-RTO: 0 %
PH UR STRIP.AUTO: 5.5 [PH]
PLATELET # BLD AUTO: 236 X10(3)/MCL (ref 130–400)
PMV BLD AUTO: 10.2 FL (ref 7.4–10.4)
POTASSIUM SERPL-SCNC: 3.5 MMOL/L (ref 3.5–5.1)
PROT SERPL-MCNC: 7.1 GM/DL (ref 6.4–8.3)
PROT UR QL STRIP.AUTO: ABNORMAL MG/DL
RBC # BLD AUTO: 4.74 X10(6)/MCL (ref 4.2–5.4)
RBC #/AREA URNS AUTO: ABNORMAL /HPF
RBC UR QL AUTO: ABNORMAL UNIT/L
SODIUM SERPL-SCNC: 141 MMOL/L (ref 136–145)
SP GR UR STRIP.AUTO: 1.03
SQUAMOUS #/AREA URNS LPF: ABNORMAL /HPF
UROBILINOGEN UR STRIP-ACNC: NORMAL MG/DL
WBC # SPEC AUTO: 10.1 X10(3)/MCL (ref 4.5–11.5)
WBC #/AREA URNS AUTO: ABNORMAL /HPF

## 2022-10-05 PROCEDURE — 81001 URINALYSIS AUTO W/SCOPE: CPT | Performed by: NURSE PRACTITIONER

## 2022-10-05 PROCEDURE — 96372 THER/PROPH/DIAG INJ SC/IM: CPT | Performed by: NURSE PRACTITIONER

## 2022-10-05 PROCEDURE — 25000003 PHARM REV CODE 250: Performed by: NURSE PRACTITIONER

## 2022-10-05 PROCEDURE — 63600175 PHARM REV CODE 636 W HCPCS: Performed by: NURSE PRACTITIONER

## 2022-10-05 PROCEDURE — 85025 COMPLETE CBC W/AUTO DIFF WBC: CPT | Performed by: NURSE PRACTITIONER

## 2022-10-05 PROCEDURE — 80053 COMPREHEN METABOLIC PANEL: CPT | Performed by: NURSE PRACTITIONER

## 2022-10-05 PROCEDURE — 99285 EMERGENCY DEPT VISIT HI MDM: CPT | Mod: 25

## 2022-10-05 PROCEDURE — 36415 COLL VENOUS BLD VENIPUNCTURE: CPT | Performed by: NURSE PRACTITIONER

## 2022-10-05 RX ORDER — KETOROLAC TROMETHAMINE 30 MG/ML
30 INJECTION, SOLUTION INTRAMUSCULAR; INTRAVENOUS
Status: COMPLETED | OUTPATIENT
Start: 2022-10-05 | End: 2022-10-05

## 2022-10-05 RX ORDER — METHOCARBAMOL 500 MG/1
1500 TABLET, FILM COATED ORAL
Status: COMPLETED | OUTPATIENT
Start: 2022-10-05 | End: 2022-10-05

## 2022-10-05 RX ADMIN — METHOCARBAMOL 1500 MG: 500 TABLET ORAL at 04:10

## 2022-10-05 RX ADMIN — KETOROLAC TROMETHAMINE 30 MG: 30 INJECTION, SOLUTION INTRAMUSCULAR; INTRAVENOUS at 04:10

## 2022-10-05 NOTE — ED PROVIDER NOTES
Encounter Date: 10/5/2022       History     Chief Complaint   Patient presents with    Flank Pain     Left flank pain that started today. Reports hx of back issues but this pain feels different than her typical back pain. Denies recent injury, heavy lifting, or urinary complaints.      The patient presents with left flank pain.  The onset was this am.  The course/duration of symptoms is constant and fluctuating in intensity.  The character of symptoms is dull.  The degree at onset was minimal.  The Location of pain at onset was left flank.  The degree at present is moderate.  The Location of pain at present is left flank.  Radiating pain: none. There are exacerbating factors including movement.  The relieving factor is none.  Therapy today: gabapentin.  Risk factors consist of chronic back pain.  Associated symptoms: denies dysuria, denies chest pain, denies nausea, denies vomiting, denies diarrhea, denies shortness of breath, denies fever, denies chills, denies headache and denies dizziness. She reports chronic low back pain and is under care of pain management. She reports that this pain is different that her normal back pain.    Review of patient's allergies indicates:  No Known Allergies  Past Medical History:   Diagnosis Date    Arthritis      Past Surgical History:   Procedure Laterality Date     SECTION      EPIDURAL STEROID INJECTION INTO LUMBAR SPINE N/A 2022    Procedure: Injection-steroid-epidural-lumbar;  Surgeon: Chet Neal MD;  Location: [facility] MAIN OR;  Service: Pain Management;  Laterality: N/A;    EPIDURAL STEROID INJECTION INTO LUMBAR SPINE N/A 2022    Procedure: Injection-steroid-epidural-lumbar;  Surgeon: Chet Neal MD;  Location: [facility] MAIN OR;  Service: Pain Management;  Laterality: N/A;    INJECTION OF ANESTHETIC AGENT INTO SACROILIAC JOINT Left 2022    Procedure: BLOCK, SACROILIAC JOINT;  Surgeon: Chet Neal MD;  Location: [facility] MAIN OR;   Service: Pain Management;  Laterality: Left;     Family History   Problem Relation Age of Onset    Lung cancer Father      Social History     Tobacco Use    Smoking status: Never    Smokeless tobacco: Never   Substance Use Topics    Alcohol use: Yes    Drug use: Never     Review of Systems   Constitutional:  Negative for fever.   HENT:  Negative for sore throat.    Respiratory:  Negative for shortness of breath.    Cardiovascular:  Negative for chest pain.   Gastrointestinal:  Negative for nausea.   Genitourinary:  Positive for flank pain. Negative for dysuria.   Musculoskeletal:  Positive for back pain.   Skin:  Negative for rash.   Neurological:  Negative for weakness.   Hematological:  Does not bruise/bleed easily.   All other systems reviewed and are negative.    Physical Exam     Initial Vitals [10/05/22 1512]   BP Pulse Resp Temp SpO2   113/80 89 20 98 °F (36.7 °C) 99 %      MAP       --         Physical Exam    Nursing note and vitals reviewed.  Constitutional: She appears well-developed and well-nourished.   HENT:   Head: Normocephalic and atraumatic.   Neck: Neck supple.   Normal range of motion.  Cardiovascular:  Normal rate, regular rhythm, normal heart sounds and intact distal pulses.           Pulmonary/Chest: Breath sounds normal.   Abdominal: Abdomen is soft. Bowel sounds are normal.   Musculoskeletal:         General: Normal range of motion.      Cervical back: Normal, normal range of motion and neck supple.      Thoracic back: Normal.      Comments: No costovertebral angle tenderness, , Thoracic: no vertebral point tenderness, Lumbar: left lateral mild tenderness, midline negative, no vertebral point tenderness, Sacral: no vertebral point tenderness, Testing: Straight leg raising, supine negative.  No C/T/L point tenderness. normal reflexes.     Neurological: She is alert. She has normal strength.   Skin: Skin is warm and dry.   Psychiatric: She has a normal mood and affect.       ED Course    Procedures  Labs Reviewed   COMPREHENSIVE METABOLIC PANEL - Abnormal; Notable for the following components:       Result Value    Glucose Level 115 (*)     All other components within normal limits   URINALYSIS, REFLEX TO URINE CULTURE - Abnormal; Notable for the following components:    Protein, UA Trace (*)     Blood, UA 2+ (*)     Squamous Epithelial Cells, UA Occ (*)     Mucous, UA Trace (*)     All other components within normal limits   CBC WITH DIFFERENTIAL - Abnormal; Notable for the following components:    MCHC 32.5 (*)     All other components within normal limits   CBC W/ AUTO DIFFERENTIAL    Narrative:     The following orders were created for panel order CBC auto differential.  Procedure                               Abnormality         Status                     ---------                               -----------         ------                     CBC with Differential[[id number]]        Abnormal            Final result                 Please view results for these tests on the individual orders.   EXTRA TUBES    Narrative:     The following orders were created for panel order EXTRA TUBES.  Procedure                               Abnormality         Status                     ---------                               -----------         ------                     Light Blue Top Hold[[id number]]                              In process                 Red Top Hold[[id number]]                                     In process                   Please view results for these tests on the individual orders.   LIGHT BLUE TOP HOLD   RED TOP HOLD          Imaging Results              CT Renal Stone Study ABD Pelvis WO (Final result)  Result time 10/05/22 16:49:11      Final result by Rei Jolley MD (10/05/22 16:49:11)                   Impression:      No abnormality seen.  No nephro or ureterolithiasis seen    No appendicitis is seen      Electronically signed by: Rei  Samanta  Date:    10/05/2022  Time:    16:49               Narrative:    EXAMINATION:  CT RENAL STONE STUDY ABD PELVIS WO    CLINICAL HISTORY:  Flank pain, kidney stone suspected;left flank pain;    TECHNIQUE:  Low dose axial images, sagittal and coronal reformations were obtained from the lung bases to the pubic symphysis.  No contrast was administered.  Automatic exposure control is utilized to reduce patient radiation exposure.    COMPARISON:  None    FINDINGS:  The lung bases are clear.    The liver appears normal.  No obvious liver mass or lesion is seen.    Gallbladder appears normal.  No gallstones are seen.    The pancreas appears normal.  No inflammatory changes are seen in the pancreatic region.    The spleen appears normal and adrenal glands appear normal.  No adrenal nodule is seen.    No nephrolithiasis is seen.  No hydronephrosis is seen.  No hydroureter is seen.  No ureteral stone is seen.    No colitis is seen.  No diverticulitis is seen.  No appendicitis is seen.    There is a cyst in the right ovary.  Right ovarian cyst measures 2.5 cm.    No free air seen.  No free fluid is seen.  The urinary bladder appears normal.    Bones show no acute abnormality.                                       Medications   ketorolac injection 30 mg (30 mg Intramuscular Given 10/5/22 1615)   methocarbamoL tablet 1,500 mg (1,500 mg Oral Given 10/5/22 1620)     Medical Decision Making:   History:   Old Records Summarized: records from clinic visits and records from previous admission(s).  Clinical Tests:   Lab Tests: Ordered and Reviewed  Radiological Study: Ordered and Reviewed  Will continue present medications and follow up with his pcp.    6:00 PM DISPOSITION: The patient is resting comfortably in no acute distress.  She is hemodynamically stable and is without objective evidence for acute process requiring urgent intervention or hospitalization. I provided counseling to patient with regard to condition, the  treatment plan, specific conditions for return, and the importance of follow up. Detailed written and verbal instructions provided to patient and she expressed a verbal understanding of the discharge instructions and overall management plan. Reiterated the importance of medication administration and safety and advised patient to follow up with primary care provider in 3-5 days or sooner if needed.  Answered questions at this time. The patient is stable for discharge.                           Clinical Impression:   Final diagnoses:  [R10.9] Left flank pain (Primary)      ED Disposition Condition    Discharge Stable          ED Prescriptions    None       Follow-up Information       Follow up With Specialties Details Why Contact Info    Ty Hager MD Internal Medicine In 3 days  [address] [facility] [address]  [phone]      [facility] Emergency Medicine  If symptoms worsen [address] [facility] [id number] [phone]             SARA Luther  10/05/22 1800

## 2022-10-24 ENCOUNTER — HOSPITAL ENCOUNTER (OUTPATIENT)
Dept: RADIOLOGY | Facility: HOSPITAL | Age: 57
Discharge: HOME | End: 2022-10-24
Attending: NURSE PRACTITIONER
Payer: MEDICAID

## 2022-10-24 DIAGNOSIS — Z12.31 VISIT FOR SCREENING MAMMOGRAM: ICD-10-CM

## 2022-10-24 PROCEDURE — 77063 MAMMO DIGITAL SCREENING BILAT WITH TOMO: ICD-10-PCS | Mod: 26,,, | Performed by: RADIOLOGY

## 2022-10-24 PROCEDURE — 77067 SCR MAMMO BI INCL CAD: CPT | Mod: 26,,, | Performed by: RADIOLOGY

## 2022-10-24 PROCEDURE — 77067 MAMMO DIGITAL SCREENING BILAT WITH TOMO: ICD-10-PCS | Mod: 26,,, | Performed by: RADIOLOGY

## 2022-10-24 PROCEDURE — 77063 BREAST TOMOSYNTHESIS BI: CPT | Mod: 26,,, | Performed by: RADIOLOGY

## 2022-10-24 PROCEDURE — 77067 SCR MAMMO BI INCL CAD: CPT | Mod: TC

## 2022-10-24 RX ORDER — METHOCARBAMOL 750 MG/1
750 TABLET, FILM COATED ORAL 2 TIMES DAILY
Qty: 30 TABLET | Refills: 1 | Status: ON HOLD | OUTPATIENT
Start: 2022-10-24 | End: 2023-11-08 | Stop reason: HOSPADM

## 2022-10-31 ENCOUNTER — TELEPHONE (OUTPATIENT)
Dept: INTERNAL MEDICINE | Facility: CLINIC | Age: 57
End: 2022-10-31
Payer: MEDICAID

## 2022-10-31 DIAGNOSIS — E55.9 VITAMIN D DEFICIENCY: ICD-10-CM

## 2022-10-31 DIAGNOSIS — E78.5 HYPERLIPIDEMIA, UNSPECIFIED HYPERLIPIDEMIA TYPE: Primary | ICD-10-CM

## 2022-10-31 NOTE — TELEPHONE ENCOUNTER
----- Message from Rena Mendieta sent at 10/31/2022 12:40 PM CDT -----  Regarding: Dr. Hager- Orders  Previous patient of Dr. Castellanos has  lab orders for her 8 mo. RTC on 11/10/22 please extend the date if still needed. Please inform patient if new orders are placed. Thanks

## 2022-11-01 NOTE — TELEPHONE ENCOUNTER
Pt called, name and  verified. Pt informed of lab orders placed. Pt reported will come in and have labs drawn on tomorrow but have changed appointment on 11/10/22 to  d/t personal issues. No further questions or concerns noted. Call ended.

## 2022-11-01 NOTE — TELEPHONE ENCOUNTER
Reordered labs, please call patient to let her know to get them.    Ty Hager MD  [facility] Internal Medicine PGY-1

## 2022-11-20 ENCOUNTER — HOSPITAL ENCOUNTER (EMERGENCY)
Facility: HOSPITAL | Age: 57
Discharge: HOME | End: 2022-11-20
Attending: INTERNAL MEDICINE
Payer: MEDICAID

## 2022-11-20 VITALS
RESPIRATION RATE: 16 BRPM | SYSTOLIC BLOOD PRESSURE: 122 MMHG | OXYGEN SATURATION: 99 % | BODY MASS INDEX: 32.89 KG/M2 | HEART RATE: 87 BPM | DIASTOLIC BLOOD PRESSURE: 79 MMHG | WEIGHT: 174.19 LBS | TEMPERATURE: 98 F | HEIGHT: 61 IN

## 2022-11-20 DIAGNOSIS — H11.31 SUBCONJUNCTIVAL BLEED, RIGHT: Primary | ICD-10-CM

## 2022-11-20 PROCEDURE — 99283 EMERGENCY DEPT VISIT LOW MDM: CPT

## 2022-11-23 ENCOUNTER — LAB VISIT (OUTPATIENT)
Dept: LAB | Facility: HOSPITAL | Age: 57
End: 2022-11-23
Payer: MEDICAID

## 2022-11-23 DIAGNOSIS — E55.9 VITAMIN D DEFICIENCY: ICD-10-CM

## 2022-11-23 DIAGNOSIS — E78.5 HYPERLIPIDEMIA, UNSPECIFIED HYPERLIPIDEMIA TYPE: ICD-10-CM

## 2022-11-23 LAB
ALBUMIN SERPL-MCNC: 4.1 GM/DL (ref 3.5–5)
ALBUMIN/GLOB SERPL: 1.4 RATIO (ref 1.1–2)
ALP SERPL-CCNC: 64 UNIT/L (ref 40–150)
ALT SERPL-CCNC: 19 UNIT/L (ref 0–55)
AST SERPL-CCNC: 22 UNIT/L (ref 5–34)
BASOPHILS # BLD AUTO: 0.06 X10(3)/MCL (ref 0–0.2)
BASOPHILS NFR BLD AUTO: 0.8 %
BILIRUBIN DIRECT+TOT PNL SERPL-MCNC: 0.9 MG/DL
BUN SERPL-MCNC: 16.6 MG/DL (ref 9.8–20.1)
CALCIUM SERPL-MCNC: 9.4 MG/DL (ref 8.4–10.2)
CHLORIDE SERPL-SCNC: 108 MMOL/L (ref 98–107)
CO2 SERPL-SCNC: 23 MMOL/L (ref 22–29)
CREAT SERPL-MCNC: 0.88 MG/DL (ref 0.55–1.02)
DEPRECATED CALCIDIOL+CALCIFEROL SERPL-MC: 27.2 NG/ML (ref 30–80)
EOSINOPHIL # BLD AUTO: 0.15 X10(3)/MCL (ref 0–0.9)
EOSINOPHIL NFR BLD AUTO: 2 %
ERYTHROCYTE [DISTWIDTH] IN BLOOD BY AUTOMATED COUNT: 11.9 % (ref 11.5–17)
EST. AVERAGE GLUCOSE BLD GHB EST-MCNC: 105.4 MG/DL
GFR SERPLBLD CREATININE-BSD FMLA CKD-EPI: >60 MLS/MIN/1.73/M2
GLOBULIN SER-MCNC: 3 GM/DL (ref 2.4–3.5)
GLUCOSE SERPL-MCNC: 95 MG/DL (ref 74–100)
HBA1C MFR BLD: 5.3 %
HCT VFR BLD AUTO: 44.2 % (ref 37–47)
HGB BLD-MCNC: 14.4 GM/DL (ref 12–16)
IMM GRANULOCYTES # BLD AUTO: 0.03 X10(3)/MCL (ref 0–0.04)
IMM GRANULOCYTES NFR BLD AUTO: 0.4 %
LYMPHOCYTES # BLD AUTO: 3.17 X10(3)/MCL (ref 0.6–4.6)
LYMPHOCYTES NFR BLD AUTO: 41.4 %
MCH RBC QN AUTO: 30.2 PG (ref 27–31)
MCHC RBC AUTO-ENTMCNC: 32.6 MG/DL (ref 33–36)
MCV RBC AUTO: 92.7 FL (ref 80–94)
MONOCYTES # BLD AUTO: 0.51 X10(3)/MCL (ref 0.1–1.3)
MONOCYTES NFR BLD AUTO: 6.7 %
NEUTROPHILS # BLD AUTO: 3.7 X10(3)/MCL (ref 2.1–9.2)
NEUTROPHILS NFR BLD AUTO: 48.7 %
NRBC BLD AUTO-RTO: 0 %
PLATELET # BLD AUTO: 237 X10(3)/MCL (ref 130–400)
PMV BLD AUTO: 9.8 FL (ref 7.4–10.4)
POTASSIUM SERPL-SCNC: 4.3 MMOL/L (ref 3.5–5.1)
PROT SERPL-MCNC: 7.1 GM/DL (ref 6.4–8.3)
RBC # BLD AUTO: 4.77 X10(6)/MCL (ref 4.2–5.4)
SODIUM SERPL-SCNC: 142 MMOL/L (ref 136–145)
WBC # SPEC AUTO: 7.7 X10(3)/MCL (ref 4.5–11.5)

## 2022-11-23 PROCEDURE — 82306 VITAMIN D 25 HYDROXY: CPT

## 2022-11-23 PROCEDURE — 80053 COMPREHEN METABOLIC PANEL: CPT

## 2022-11-23 PROCEDURE — 83036 HEMOGLOBIN GLYCOSYLATED A1C: CPT

## 2022-11-23 PROCEDURE — 36415 COLL VENOUS BLD VENIPUNCTURE: CPT

## 2022-11-23 PROCEDURE — 85025 COMPLETE CBC W/AUTO DIFF WBC: CPT

## 2022-12-19 PROBLEM — M53.3 SACROILIAC JOINT PAIN: Status: ACTIVE | Noted: 2022-12-19

## 2023-01-03 ENCOUNTER — OFFICE VISIT (OUTPATIENT)
Dept: INTERNAL MEDICINE | Facility: CLINIC | Age: 58
End: 2023-01-03
Payer: MEDICAID

## 2023-01-03 VITALS
SYSTOLIC BLOOD PRESSURE: 119 MMHG | TEMPERATURE: 98 F | DIASTOLIC BLOOD PRESSURE: 85 MMHG | OXYGEN SATURATION: 99 % | HEART RATE: 87 BPM | BODY MASS INDEX: 33.79 KG/M2 | RESPIRATION RATE: 18 BRPM | WEIGHT: 179 LBS | HEIGHT: 61 IN

## 2023-01-03 DIAGNOSIS — E78.5 HYPERLIPIDEMIA, UNSPECIFIED HYPERLIPIDEMIA TYPE: ICD-10-CM

## 2023-01-03 DIAGNOSIS — M54.16 LUMBAR RADICULOPATHY: Primary | ICD-10-CM

## 2023-01-03 DIAGNOSIS — E55.9 VITAMIN D DEFICIENCY: ICD-10-CM

## 2023-01-03 PROCEDURE — 99213 OFFICE O/P EST LOW 20 MIN: CPT | Mod: PBBFAC

## 2023-01-03 RX ORDER — GABAPENTIN 300 MG/1
300 CAPSULE ORAL 3 TIMES DAILY
COMMUNITY
Start: 2022-08-02 | End: 2023-01-03 | Stop reason: DRUGHIGH

## 2023-01-03 NOTE — PROGRESS NOTES
INTERNAL MEDICINE RESIDENT CLINIC  CLINIC NOTE    Patient Name: An Ly  YOB: 1965    PRESENTING HISTORY    PMHx: vasomotor symptoms, HLD, lumbar radiculopathy    History of Present Illness:  Ms. An Ly is a 57 y.o. female previously seen by Dr. Castellanos, last seen 3/2022.    Last Note: Patient reports overall doing better. Patient was evaluated by neurosurgeon in [place] and was told that she would need surgery. Patient refused surgery and opted for CSI instead. She had her first CSI in her lower back in Dec 2021 and completed PT since then. She reports significant improvement in her back pain. Not complaining of any back pain today. No saddle anesthesia, no urinary or bowel incontinence. no other numbness/tingling in any of her other extremities. 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 She denies any lower leg swelling, calf pain, redness. She also is no longer taking HRT since July 2021. Today reports symptoms of vaginal itching, dryness and pain with intercourse x 2 weeks. Reports she was seen at our [facility] urgent care, tested negative for STDs, negative for UTI, negative for yeast infection. Reports she tried to contact gynecology clinic and was told they would have to give her a call back. She is going on a trip to [place] on Sunday and requesting something for her trip. Discussed that I would prescribe her topical estrogen cream for 2 weeks to help with vaginal atrophy symptoms during her trip and instructed her to notify gynecology as well.    Today: Today patient is doing well. She had Radiofrequency Ablation of the sacral lateral branches (Bilateral) L5-S3 which has allowed her to lower her gabapentin to 300mg TID though she skips doses. She does not report any chest pain, headache, GI and urinary symptoms. Pain still there with heavy work, however improved especially on walking. Follows with Women's health and endorses no hot  flashes on HRT (restarted again 2022). Otherwise no issues.     CURRENT MEDICATIONS      Current Outpatient Medications on File Prior to Visit   Medication Sig    estradioL (ESTRACE) 0.5 MG tablet Take 1 tablet (0.5 mg total) by mouth once daily.    gabapentin (NEURONTIN) 600 MG tablet Take 1 tablet (600 mg total) by mouth 3 (three) times daily.    medroxyPROGESTERone (PROVERA) 2.5 MG tablet Take 1 tablet (2.5 mg total) by mouth once daily.    [DISCONTINUED] gabapentin (NEURONTIN) 300 MG capsule Take 300 mg by mouth 3 (three) times daily.    dextran 70-hypromellose (TEARS) ophthalmic solution Place 1 drop into the right eye every 4 (four) hours. (Patient not taking: Reported on 1/3/2023)    methocarbamoL (ROBAXIN) 750 MG Tab Take 1 tablet (750 mg total) by mouth 2 (two) times daily. (Patient not taking: Reported on 1/3/2023)     No current facility-administered medications on file prior to visit.         Review of Systems   Constitutional:  Negative for chills, fever and weight loss.   Respiratory:  Negative for cough.    Cardiovascular:  Negative for chest pain.   Gastrointestinal:  Negative for abdominal pain, diarrhea and vomiting.   Genitourinary:  Negative for dysuria and frequency.   Neurological:  Negative for dizziness.     PAST HISTORY:     Past Medical History:   Diagnosis Date    Arthritis        Past Surgical History:   Procedure Laterality Date     SECTION      EPIDURAL STEROID INJECTION INTO LUMBAR SPINE N/A 2022    Procedure: Injection-steroid-epidural-lumbar;  Surgeon: Chet Neal MD;  Location: [facility] MAIN OR;  Service: Pain Management;  Laterality: N/A;    EPIDURAL STEROID INJECTION INTO LUMBAR SPINE N/A 2022    Procedure: Injection-steroid-epidural-lumbar;  Surgeon: Chet Neal MD;  Location: [facility] MAIN OR;  Service: Pain Management;  Laterality: N/A;    INJECTION OF ANESTHETIC AGENT INTO SACROILIAC JOINT Left 2022    Procedure: BLOCK, SACROILIAC JOINT;   "Surgeon: Chet Neal MD;  Location: [facility] MAIN OR;  Service: Pain Management;  Laterality: Left;    INJECTION OF ANESTHETIC AGENT INTO SACROILIAC JOINT Right 10/12/2022    Procedure: BLOCK, SACROILIAC JOINT;  Surgeon: Chet Neal MD;  Location: [facility] MAIN OR;  Service: Pain Management;  Laterality: Right;    RADIOFREQUENCY ABLATION Bilateral 12/19/2022    Procedure: Radiofrequency Ablation sacral lateral branches;  Surgeon: Chet Neal MD;  Location: [facility] MAIN OR;  Service: Pain Management;  Laterality: Bilateral;       Family History   Problem Relation Age of Onset    Lung cancer Father        Social History     Socioeconomic History    Marital status: Single   Tobacco Use    Smoking status: Never    Smokeless tobacco: Never   Substance and Sexual Activity    Alcohol use: Yes    Drug use: Never    Sexual activity: Yes     Birth control/protection: None       Review of patient's allergies indicates:  No Known Allergies    OBJECTIVE:   Vital Signs:  Vitals:    01/03/23 0741   BP: 119/85   Pulse: 87   Resp: 18   Temp: 98.1 °F (36.7 °C)   SpO2: 99%   Weight: 81.2 kg (179 lb)   Height: 5' 1" (1.549 m)       No results found for this or any previous visit (from the past 24 hour(s)).      Physical Exam  Constitutional:       Appearance: Normal appearance. She is normal weight.   HENT:      Head: Normocephalic and atraumatic.      Nose: Nose normal.      Mouth/Throat:      Mouth: Mucous membranes are dry.   Eyes:      Extraocular Movements: Extraocular movements intact.      Conjunctiva/sclera: Conjunctivae normal.      Pupils: Pupils are equal, round, and reactive to light.   Cardiovascular:      Rate and Rhythm: Normal rate and regular rhythm.      Pulses: Normal pulses.      Heart sounds: Normal heart sounds.   Pulmonary:      Effort: Pulmonary effort is normal.      Breath sounds: Normal breath sounds.   Abdominal:      General: Abdomen is flat.      Palpations: Abdomen is soft. "   Musculoskeletal:         General: Normal range of motion.      Cervical back: Normal range of motion.      Comments: Injection sites noted on sacrum, no swelling, no erythema   Skin:     General: Skin is warm.      Capillary Refill: Capillary refill takes less than 2 seconds.   Neurological:      General: No focal deficit present.      Mental Status: She is alert and oriented to person, place, and time.   Psychiatric:         Mood and Affect: Mood normal.         Behavior: Behavior normal.     Laboratory  CMP:   Recent Labs   Lab 11/09/21  0712 10/05/22  1700 11/23/22  0706   Sodium Level 140 141 142   Potassium Level 3.8 3.5 4.3   Chloride 107 104 108 H   Carbon Dioxide 23 25 23   Blood Urea Nitrogen 17.5 18.7 16.6   Creatinine 0.69 0.93 0.88   Glucose Level 94 115 H 95   Calcium Level Total 9.8 9.5 9.4   Albumin Level 4.1 4.3 4.1   Bilirubin Total 0.8 0.9 0.9   Bilirubin Direct 0.3  --   --    Bilirubin Indirect 0.50  --   --    Aspartate Aminotransferase 23 20 22   Alanine Aminotransferase 21 17 19   Alkaline Phosphatase 55 70 64     CBC:   Recent Labs   Lab 11/09/21  0712 10/05/22  1700 11/23/22  0706   WBC 6.1 10.1 7.7   Neut # 2.46 5.5 3.7   RBC 4.42 4.74 4.77   Hgb 13.6 14.4 14.4   Hct 40.9 44.3 44.2   Platelet 209 236 237   MCV 92.5 93.5 92.7   RDW 12.3 11.9 11.9     FLP:     DM:   Recent Labs   Lab 11/09/21  0712 10/05/22  1700 11/23/22  0706   Hemoglobin A1c  --   --  5.3   Creatinine 0.69 0.93 0.88     Thyroid:       Invalid input(s): [id number]  Anemia:   Recent Labs   Lab 11/23/22  0706   Hgb 14.4   Hct 44.2             ASSESSMENT & PLAN:     Hx of vasomotor symptoms  followed by GYN, last seen by GYN on 7/2022 - prescribed:  -     estradioL (ESTRACE) 0.5 MG tablet; Take 1 tablet (0.5 mg total) by mouth once daily.  -     medroxyPROGESTERone (PROVERA) 2.5 MG tablet; Take 1 tablet (2.5 mg total) by mouth once daily.    Hypercholesterolemia  Repeat Lipid panel 06/2021, , HDL 63, repeat  pending  ASCVD risk 1.7% no statin therapy recommended at last visit  Lipid panel for next visit    Chronic lower lumbar radiculopathy  Sciatic nerve pain  - XR lumbar spine done 12/2020 no listhesis, Possible L5-S1 facet overgrowth. Lumbar disc spaces are preserved.  - XR pelvis showed degenerative changes; ESR, CRP, SORAYA, HLA-B27 all negative  - continue gabapentin 300mg TID  - follows with neurosurgery in [facility] - s/p Radiofrequency Ablation sacral lateral branches (Bilateral) L5-S3 on 12/19/2022  -    Vit D deficiency  -vit D level increased to now 30.7 -> 27.2   -Rx for vit D3 1000IU daily - to continue (in addition she is taking multivitamin with 800U daily OTC which I instructed her to continue as well)      Health Maintenance Due   Topic Date Due    Lipid Panel  Never done    COVID-19 Vaccine (1) Never done           Health Maintenance/ Wellness  TDAP: UTD  Zoster: UTD  Influenza: refused  HIV/Hep screening: negative on 12/2020  CAGE screening: done 8.14.20  Influenza vaccine: pt refused today  Screening colonoscopy: normal colonoscopy on 3/29/2021  Pap smear: 7/2022 done, repeat 7/2025  Mammogram: MG on 10/13/21 negative BI-RADS1 repeat 10/13/23    Future Appointments     Future Appointments   Date Time Provider Department Center   7/17/2023  9:10 AM PAUL Sands [facility]      No orders of the defined types were placed in this encounter.        Discussed with Dr. Salas - Staff Attestation to Follow    Ty Hager MD  \[facility]\"" Internal Medicine PGY-1

## 2023-06-19 ENCOUNTER — LAB VISIT (OUTPATIENT)
Dept: LAB | Facility: HOSPITAL | Age: 58
End: 2023-06-19
Payer: MEDICAID

## 2023-06-19 DIAGNOSIS — E78.5 HYPERLIPIDEMIA, UNSPECIFIED HYPERLIPIDEMIA TYPE: ICD-10-CM

## 2023-06-19 DIAGNOSIS — E55.9 VITAMIN D DEFICIENCY: ICD-10-CM

## 2023-06-19 LAB
CHOLEST SERPL-MCNC: 211 MG/DL
CHOLEST/HDLC SERPL: 4 {RATIO} (ref 0–5)
HDLC SERPL-MCNC: 57 MG/DL (ref 35–60)
LDLC SERPL CALC-MCNC: 130 MG/DL (ref 50–140)
TRIGL SERPL-MCNC: 119 MG/DL (ref 37–140)
VLDLC SERPL CALC-MCNC: 24 MG/DL

## 2023-06-19 PROCEDURE — 80061 LIPID PANEL: CPT

## 2023-06-19 PROCEDURE — 36415 COLL VENOUS BLD VENIPUNCTURE: CPT

## 2023-06-20 ENCOUNTER — OFFICE VISIT (OUTPATIENT)
Dept: INTERNAL MEDICINE | Facility: CLINIC | Age: 58
End: 2023-06-20
Payer: MEDICAID

## 2023-06-20 VITALS
HEART RATE: 87 BPM | BODY MASS INDEX: 32.28 KG/M2 | OXYGEN SATURATION: 98 % | TEMPERATURE: 98 F | RESPIRATION RATE: 18 BRPM | DIASTOLIC BLOOD PRESSURE: 81 MMHG | WEIGHT: 171 LBS | HEIGHT: 61 IN | SYSTOLIC BLOOD PRESSURE: 120 MMHG

## 2023-06-20 DIAGNOSIS — E78.5 HYPERLIPIDEMIA, UNSPECIFIED HYPERLIPIDEMIA TYPE: ICD-10-CM

## 2023-06-20 DIAGNOSIS — M54.16 LUMBAR RADICULOPATHY: Primary | ICD-10-CM

## 2023-06-20 DIAGNOSIS — E55.9 VITAMIN D DEFICIENCY: ICD-10-CM

## 2023-06-20 PROCEDURE — 99213 OFFICE O/P EST LOW 20 MIN: CPT | Mod: PBBFAC

## 2023-06-20 RX ORDER — GABAPENTIN 600 MG/1
600 TABLET ORAL 3 TIMES DAILY
Qty: 90 TABLET | Refills: 11 | Status: SHIPPED | OUTPATIENT
Start: 2023-06-20 | End: 2023-06-20

## 2023-06-20 RX ORDER — GABAPENTIN 600 MG/1
600 TABLET ORAL 3 TIMES DAILY
Qty: 90 TABLET | Refills: 11 | Status: SHIPPED | OUTPATIENT
Start: 2023-06-20 | End: 2024-06-19

## 2023-06-20 NOTE — PROGRESS NOTES
I have reviewed and concur with the resident's history, physical, assessment, and plan.  I have discussed with him all issues related to the diagnosis, workup and treatment plan. Care provided as reasonable and necessary.    Kendell Barcenas MD  [facility]

## 2023-06-20 NOTE — PROGRESS NOTES
INTERNAL MEDICINE RESIDENT CLINIC  CLINIC NOTE    Patient Name: An Ly  YOB: 1965    PRESENTING HISTORY    PMHx: vasomotor symptoms, HLD, lumbar radiculopathy    History of Present Illness:  Ms. An Ly is a 58 y.o. female previously seen by Dr. Castellanos, last seen 3/2022.    Last Note: Patient reports overall doing better. Patient was evaluated by neurosurgeon in [place] and was told that she would need surgery. Patient refused surgery and opted for CSI instead. She had her first CSI in her lower back in Dec 2021 and completed PT since then. She reports significant improvement in her back pain. Not complaining of any back pain today. No saddle anesthesia, no urinary or bowel incontinence. no other numbness/tingling in any of her other extremities. 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 She denies any lower leg swelling, calf pain, redness. She also is no longer taking HRT since July 2021. Today reports symptoms of vaginal itching, dryness and pain with intercourse x 2 weeks. Reports she was seen at our [facility] urgent care, tested negative for STDs, negative for UTI, negative for yeast infection. Reports she tried to contact gynecology clinic and was told they would have to give her a call back. She is going on a trip to [place] on Sunday and requesting something for her trip. Discussed that I would prescribe her topical estrogen cream for 2 weeks to help with vaginal atrophy symptoms during her trip and instructed her to notify gynecology as well.    Today:  Today patient states she is doing well.  The ablation on her back has been effective, however she thinks that it is starting to wear off.  While she walks eventually her legs become like cement more so recently.  She tells me that the intervention is good for 6 months to 2 years and I advised her to get back in with the group that allowed her to undergo this ablation for  re-evaluation.  Otherwise she takes her gabapentin which allows her to function well.  She does not claim any weight loss, fevers, night sweats, back pain at night.    CURRENT MEDICATIONS      Current Outpatient Medications on File Prior to Visit   Medication Sig    estradioL (ESTRACE) 0.5 MG tablet Take 1 tablet (0.5 mg total) by mouth once daily.    gabapentin (NEURONTIN) 600 MG tablet Take 1 tablet (600 mg total) by mouth 3 (three) times daily.    medroxyPROGESTERone (PROVERA) 2.5 MG tablet Take 1 tablet (2.5 mg total) by mouth once daily.    dextran 70-hypromellose (TEARS) ophthalmic solution Place 1 drop into the right eye every 4 (four) hours. (Patient not taking: Reported on 2023)    methocarbamoL (ROBAXIN) 750 MG Tab Take 1 tablet (750 mg total) by mouth 2 (two) times daily. (Patient not taking: Reported on 2023)     No current facility-administered medications on file prior to visit.         Review of Systems   Constitutional:  Negative for chills, fever and weight loss.   Respiratory:  Negative for cough.    Cardiovascular:  Negative for chest pain.   Gastrointestinal:  Negative for abdominal pain, diarrhea and vomiting.   Genitourinary:  Negative for dysuria and frequency.   Neurological:  Negative for dizziness.     PAST HISTORY:     Past Medical History:   Diagnosis Date    Arthritis        Past Surgical History:   Procedure Laterality Date     SECTION      EPIDURAL STEROID INJECTION INTO LUMBAR SPINE N/A 2022    Procedure: Injection-steroid-epidural-lumbar;  Surgeon: Chet Neal MD;  Location: [facility] MAIN OR;  Service: Pain Management;  Laterality: N/A;    EPIDURAL STEROID INJECTION INTO LUMBAR SPINE N/A 2022    Procedure: Injection-steroid-epidural-lumbar;  Surgeon: Chet Neal MD;  Location: [facility] MAIN OR;  Service: Pain Management;  Laterality: N/A;    INJECTION OF ANESTHETIC AGENT INTO SACROILIAC JOINT Left 2022    Procedure: BLOCK, SACROILIAC JOINT;   "Surgeon: Chet Neal MD;  Location: [facility] MAIN OR;  Service: Pain Management;  Laterality: Left;    INJECTION OF ANESTHETIC AGENT INTO SACROILIAC JOINT Right 10/12/2022    Procedure: BLOCK, SACROILIAC JOINT;  Surgeon: Chet Neal MD;  Location: [facility] MAIN OR;  Service: Pain Management;  Laterality: Right;    RADIOFREQUENCY ABLATION Bilateral 12/19/2022    Procedure: Radiofrequency Ablation sacral lateral branches;  Surgeon: Chet Neal MD;  Location: [facility] MAIN OR;  Service: Pain Management;  Laterality: Bilateral;       Family History   Problem Relation Age of Onset    Lung cancer Father        Social History     Socioeconomic History    Marital status: Single   Tobacco Use    Smoking status: Never    Smokeless tobacco: Never   Substance and Sexual Activity    Alcohol use: Yes    Drug use: Never    Sexual activity: Yes     Birth control/protection: None       Review of patient's allergies indicates:  No Known Allergies    OBJECTIVE:   Vital Signs:  Vitals:    06/20/23 1306   BP: 120/81   Pulse: 87   Resp: 18   Temp: 97.7 °F (36.5 °C)   TempSrc: Oral   SpO2: 98%   Weight: 77.6 kg (171 lb)   Height: 5' 1" (1.549 m)       No results found for this or any previous visit (from the past 24 hour(s)).      Physical Exam  Constitutional:       Appearance: Normal appearance. She is normal weight.   HENT:      Head: Normocephalic and atraumatic.      Nose: Nose normal.      Mouth/Throat:      Mouth: Mucous membranes are dry.   Eyes:      Extraocular Movements: Extraocular movements intact.      Conjunctiva/sclera: Conjunctivae normal.      Pupils: Pupils are equal, round, and reactive to light.   Cardiovascular:      Rate and Rhythm: Normal rate and regular rhythm.      Pulses: Normal pulses.      Heart sounds: Normal heart sounds.   Pulmonary:      Effort: Pulmonary effort is normal.      Breath sounds: Normal breath sounds.   Abdominal:      General: Abdomen is flat.      Palpations: Abdomen is soft. "   Musculoskeletal:         General: Normal range of motion.      Cervical back: Normal range of motion.   Skin:     General: Skin is warm.      Capillary Refill: Capillary refill takes less than 2 seconds.   Neurological:      General: No focal deficit present.      Mental Status: She is alert and oriented to person, place, and time.   Psychiatric:         Mood and Affect: Mood normal.         Behavior: Behavior normal.     Laboratory  CMP:   Recent Labs   Lab 11/09/21  0712 10/05/22  1700 11/23/22  0706   Sodium Level 140 141 142   Potassium Level 3.8 3.5 4.3   Chloride 107 104 108 H   Carbon Dioxide 23 25 23   Blood Urea Nitrogen 17.5 18.7 16.6   Creatinine 0.69 0.93 0.88   Glucose Level 94 115 H 95   Calcium Level Total 9.8 9.5 9.4   Albumin Level 4.1 4.3 4.1   Bilirubin Total 0.8 0.9 0.9   Bilirubin Direct 0.3  --   --    Bilirubin Indirect 0.50  --   --    Aspartate Aminotransferase 23 20 22   Alanine Aminotransferase 21 17 19   Alkaline Phosphatase 55 70 64       CBC:   Recent Labs   Lab 11/09/21  0712 10/05/22  1700 11/23/22  0706   WBC 6.1 10.1 7.7   Neut # 2.46 5.5 3.7   RBC 4.42 4.74 4.77   Hgb 13.6 14.4 14.4   Hct 40.9 44.3 44.2   Platelet 209 236 237   MCV 92.5 93.5 92.7   RDW 12.3 11.9 11.9       FLP:   Recent Labs   Lab 06/19/23  0840   Cholesterol Total 211 H   HDL Cholesterol 57   LDL Cholesterol 130.00   Triglyceride 119     DM:   Recent Labs   Lab 11/09/21  0712 10/05/22  1700 11/23/22  0706   Hemoglobin A1c  --   --  5.3   Creatinine 0.69 0.93 0.88       Thyroid:       Invalid input(s): EIGFAM5TCLL  Anemia:   Recent Labs   Lab 11/23/22  0706   Hgb 14.4   Hct 44.2               ASSESSMENT & PLAN:     Hx of vasomotor symptoms  followed by GYN, last seen by GYN on 7/2022 - prescribed:  -     estradioL (ESTRACE) 0.5 MG tablet; Take 1 tablet (0.5 mg total) by mouth once daily.  -     medroxyPROGESTERone (PROVERA) 2.5 MG tablet; Take 1 tablet (2.5 mg total) by mouth once  daily.    Hypercholesterolemia  Repeat Lipid panel 6/19/2023, ,   ASCVD risk 1.7% no statin therapy recommended at last visit      Chronic lower lumbar radiculopathy  Sciatic nerve pain  - XR lumbar spine done 12/2020 no listhesis, Possible L5-S1 facet overgrowth. Lumbar disc spaces are preserved.  - XR pelvis showed degenerative changes; ESR, CRP, SORAYA, HLA-B27 all negative  - continue gabapentin 600mg TID  - follows with neurosurgery in [facility] - s/p Radiofrequency Ablation sacral lateral branches (Bilateral) L5-S3 on 12/19/2022      Vit D deficiency  -vit D level increased to now 30.7 -> 27.2   -Rx for vit D3 1000IU daily - to continue (in addition she is taking multivitamin with 800U daily OTC which I instructed her to continue as well)              Health Maintenance/ Wellness  TDAP: UTD  Zoster: UTD  Influenza: refused  HIV/Hep screening: negative on 12/2020  CAGE screening: done 8.14.20  Influenza vaccine: pt refused today  Screening colonoscopy: normal colonoscopy on 3/29/2021  Pap smear: 7/2022 done, repeat 7/2025  Mammogram: MG on 10/13/21 negative BI-RADS1 repeat 10/13/23    RTC 1 Year. Labs then. Medications renewed.    Future Appointments     Future Appointments   Date Time Provider Department Center   7/17/2023  9:10 AM PA[facility] GYN [place]      No orders of the defined types were placed in this encounter.        Discussed with Dr. Barcenas - Staff Attestation to Follow    Ty Hgaer MD  [facility] PGY-1

## 2023-06-21 ENCOUNTER — PATIENT MESSAGE (OUTPATIENT)
Dept: ADMINISTRATIVE | Facility: HOSPITAL | Age: 58
End: 2023-06-21
Payer: MEDICAID

## 2023-07-10 ENCOUNTER — PATIENT OUTREACH (OUTPATIENT)
Dept: ADMINISTRATIVE | Facility: HOSPITAL | Age: 58
End: 2023-07-10
Payer: MEDICAID

## 2023-07-10 NOTE — PROGRESS NOTES
[facility] Bulk Outreach via portal for CRC screening. Pt responded she hasn't received kit. Pt does not need at home test at this time. Colonoscopy completed 3/29/2021 per Dr Call. Record found in [employer] and uploaded to [facility] Chart Review & Patient Outreach Details:     Reason for Outreach Encounter:     []  Non-Compliant Report   []  Payor Report ([employer], PHN, BCBS, MSSP, MCIP, UHC, etc.)   []  Pre-Visit Chart Review     Updates Requested / Reviewed:     []  Care Everywhere    []     []  External Sources ([facility], [employer], DIS, etc.)   [x]  Care Team Updated    Patient Outreach Method:    []  Telephone Outreach Completed   [] Successful   [] Left Voicemail   [] Unable to Contact (wrong number, no voicemail)  []  [facility] Portal Outreach Sent  []  Letter Outreach Mailed  []  Fax Sent for External Records  [x]  External Records Upload    Health Maintenance Topics Addressed and Outreach Outcomes / Actions Taken:        []      Breast Cancer Screening []  Mammo Scheduled      []  External Records Requested     []  Added Reminder to Complete to Upcoming Primary Care Appt Notes     []  Patient Declined     []  Patient Will Call Back to Schedule     []  Patient Will Schedule with External Provider / Order Routed if Applicable             []       Cervical Cancer Screening []  Pap Scheduled      []  External Records Requested     []  Added Reminder to Complete to Upcoming Primary Care Appt Notes     []  Patient Declined     []  Patient Will Call Back to Schedule     []  Patient Will Schedule with External Provider               [x]          Colorectal Cancer Screening []  Colonoscopy Case Request or Referral Placed     [x]  External Records uploaded     []  Added Reminder to Complete to Upcoming Primary Care Appt Notes     []  Patient Declined     []  Patient Will Call Back to Schedule     []  Patient Will Schedule with External Provider     []  Fit Kit Mailed (add the SmartPhrase  under additional notes)     []  Reminded Patient to Complete Home Test             []      Diabetic Eye Exam []  Eye Camera Scheduled or Optometry Referral Placed     []  External Records Requested     []  Added Reminder to Complete to Upcoming Primary Care Appt Notes     []  Patient Declined     []  Patient Will Call Back to Schedule     []  Patient Will Schedule with External Provider             []      Blood Pressure Control []  Primary Care Follow Up Visit Scheduled     []  Remote Blood Pressure Reading Captured     []  Added Reminder to Complete to Upcoming Primary Care Appt Notes     []  Patient Declined     []  Patient Will Call Back / Patient Will Send Portal Message with Reading     []  Patient Will Call Back to Schedule Provider Visit             []       HbA1c & Other Labs []  Lab Appt Scheduled for Due Labs     []  Primary Care Follow Up Visit Scheduled      []  Reminded Patient to Complete Home Test     []  Added Reminder to Complete to Upcoming Primary Care Appt Notes     []  Patient Declined     []  Patient Will Call Back to Schedule     []  Patient Will Schedule with External Provider / Order Routed if Applicable           []    Schedule Primary Care Appt []  Primary Care Appt Scheduled     []  Patient Declined     []  Patient Will Call Back to Schedule     []  Pt Established with External Provider & Updated Care Team             []      Medication Adherence []  Primary Care Appointment Scheduled     []  Added Reminder to Upcoming Primary Care Appt Notes     []  Patient Reminded to  Prescription     []  Patient Declined, Provider Notified if Needed     []  Sent Provider Message to Review and/or Add Exclusion to Problem List             []      Osteoporosis Screening []  DXA Appointment Scheduled     []  External Records Requested     []  Added Reminder to Complete to Upcoming Primary Care Appt Notes     []  Patient Declined     []  Patient Will Call Back to Schedule     []  Patient Will  Schedule with External Provider / Order Routed if Applicable     Additional Care Coordinator Notes:         Further Action Needed If Patient Returns Outreach:

## 2023-07-17 ENCOUNTER — OFFICE VISIT (OUTPATIENT)
Dept: GYNECOLOGY | Facility: CLINIC | Age: 58
End: 2023-07-17
Payer: MEDICAID

## 2023-07-17 VITALS
HEART RATE: 98 BPM | BODY MASS INDEX: 32.51 KG/M2 | SYSTOLIC BLOOD PRESSURE: 123 MMHG | DIASTOLIC BLOOD PRESSURE: 84 MMHG | TEMPERATURE: 98 F | RESPIRATION RATE: 20 BRPM | WEIGHT: 172.19 LBS | OXYGEN SATURATION: 100 % | HEIGHT: 61 IN

## 2023-07-17 DIAGNOSIS — R23.2 HOT FLASHES: ICD-10-CM

## 2023-07-17 DIAGNOSIS — Z01.419 ENCOUNTER FOR ANNUAL ROUTINE GYNECOLOGICAL EXAMINATION: Primary | ICD-10-CM

## 2023-07-17 DIAGNOSIS — Z12.31 VISIT FOR SCREENING MAMMOGRAM: ICD-10-CM

## 2023-07-17 PROCEDURE — 99396 PR PREVENTIVE VISIT,EST,40-64: ICD-10-PCS | Mod: S$PBB,,, | Performed by: NURSE PRACTITIONER

## 2023-07-17 PROCEDURE — 3079F PR MOST RECENT DIASTOLIC BLOOD PRESSURE 80-89 MM HG: ICD-10-PCS | Mod: CPTII,,, | Performed by: NURSE PRACTITIONER

## 2023-07-17 PROCEDURE — 3008F PR BODY MASS INDEX (BMI) DOCUMENTED: ICD-10-PCS | Mod: CPTII,,, | Performed by: NURSE PRACTITIONER

## 2023-07-17 PROCEDURE — 99396 PREV VISIT EST AGE 40-64: CPT | Mod: S$PBB,,, | Performed by: NURSE PRACTITIONER

## 2023-07-17 PROCEDURE — 1159F MED LIST DOCD IN RCRD: CPT | Mod: CPTII,,, | Performed by: NURSE PRACTITIONER

## 2023-07-17 PROCEDURE — 3074F PR MOST RECENT SYSTOLIC BLOOD PRESSURE < 130 MM HG: ICD-10-PCS | Mod: CPTII,,, | Performed by: NURSE PRACTITIONER

## 2023-07-17 PROCEDURE — 3008F BODY MASS INDEX DOCD: CPT | Mod: CPTII,,, | Performed by: NURSE PRACTITIONER

## 2023-07-17 PROCEDURE — 99213 OFFICE O/P EST LOW 20 MIN: CPT | Mod: PBBFAC | Performed by: NURSE PRACTITIONER

## 2023-07-17 PROCEDURE — 1159F PR MEDICATION LIST DOCUMENTED IN MEDICAL RECORD: ICD-10-PCS | Mod: CPTII,,, | Performed by: NURSE PRACTITIONER

## 2023-07-17 PROCEDURE — 3074F SYST BP LT 130 MM HG: CPT | Mod: CPTII,,, | Performed by: NURSE PRACTITIONER

## 2023-07-17 PROCEDURE — 3079F DIAST BP 80-89 MM HG: CPT | Mod: CPTII,,, | Performed by: NURSE PRACTITIONER

## 2023-07-17 RX ORDER — MEDROXYPROGESTERONE ACETATE 2.5 MG/1
2.5 TABLET ORAL DAILY
Qty: 30 TABLET | Refills: 12 | Status: SHIPPED | OUTPATIENT
Start: 2023-07-17

## 2023-07-17 RX ORDER — ESTRADIOL 0.5 MG/1
0.5 TABLET ORAL DAILY
Qty: 30 TABLET | Refills: 12 | Status: SHIPPED | OUTPATIENT
Start: 2023-07-17

## 2023-07-17 NOTE — PROGRESS NOTES
Subjective:       Patient ID: An Ly is a 58 y.o. female.    Chief Complaint:  Gynecologic Exam      History of Present Illness  Pt is  here for annual GYN exam. Pt has been post menopausal since age 48yo. Last pap--NIL and HPV neg. Denies any abn. paps in the past. Denies discharge, itching or bleeding. Denies abd. or pelvic pain. Denies breast or urinary complaints. MG-10/24/22-BIRADS 1. Pt has hx of bothersome hot flashes, night sweats, and mood swings in the past. Originally she was placed on Activella (oral) and was doing well, but had to switch due to insurance reasons. She was switched to Prempro and stopped Prempro after about about 2 months due to breast pain. She was being treated with estradiol and provera with multiple attempts to stop over the last few years. She has stopped over  as she was not experiencing hot flashes however once she stopped hot flashes resumed. She also c/o vaginal dryness, attempted Premarin cream however does not feel that it helped.  She is currently taking daily Estradiol 0.5 and Provera 2.5. Also on neurontin for back pain. Does admit to increased sweating over last 2 weeks. She currently declines use of SSRI/SNRI for treatment. She denies any medical hx, no hx of HTN, DVT, PE, CP, SOB, MI or CVA or breast cancer. She is a non smoker. HLD being monitored by PCP. Denies fly hx of breast, ovarian, uterine or colon cancer. FIT positive in -colonoscopy  which only showed diverticulosis, repeat in 10 years.    GYN & OB History  No LMP recorded. Patient is postmenopausal.   Date of Last Pap: 2022    Review of patient's allergies indicates:  No Known Allergies  Past Medical History:   Diagnosis Date    Arthritis      OB History    Para Term  AB Living   2 2           SAB IAB Ectopic Multiple Live Births                  # Outcome Date GA Lbr Noam/2nd Weight Sex Delivery Anes PTL Lv   2 Para            1 Para                 Review of  "Systems  Review of Systems    Negative except for pertinent findings for positives per HPI     Objective:    Physical Exam    /84 (BP Location: Left arm, Patient Position: Sitting, BP Method: Medium (Automatic))   Pulse 98   Temp 98.4 °F (36.9 °C) (Oral)   Resp 20   Ht 5' 1" (1.549 m)   Wt 78.1 kg (172 lb 3.2 oz)   SpO2 100%   BMI 32.54 kg/m²   GENERAL: Well-developed female in no acute distress.  SKIN: Normal to inspection, warm and intact.  BREASTS: No masses, lumps, discharge, tenderness.  VULVA: General appearance normal; external genitalia with no lesions or erythema.  VAGINA: Mucosa normal, pink, no abnormal discharge or lesions.  CERVIX: Grossly normal, pink, no erythema or abnormal discharge.  BIMANUAL EXAM: reveals a 12 week-sized uterus. The uterus is regular, mobile, and non tender. Alex adnexa reveal no evidence of masses, tenderness.  PSYCHIATRIC: Patient is oriented to person, place, and time. Mood and affect are normal.    Assessment:       1. Encounter for annual routine gynecological examination    2. Hot flashes    3. Visit for screening mammogram       Plan:   An was seen today for gynecologic exam.    Diagnoses and all orders for this visit:    Encounter for annual routine gynecological examination    Hot flashes  -     medroxyPROGESTERone (PROVERA) 2.5 MG tablet; Take 1 tablet (2.5 mg total) by mouth once daily.  -     estradioL (ESTRACE) 0.5 MG tablet; Take 1 tablet (0.5 mg total) by mouth once daily.    Visit for screening mammogram  -     Mammo Digital Screening Bilat w/ Bi; Future    Pelvic today, pap utd per ACOG  MG ordered  Continue HRT for hot flashes, mood swings.  Follow up in about 1 year (around 7/17/2024) for annual exam.    "

## 2023-08-21 ENCOUNTER — TELEPHONE (OUTPATIENT)
Dept: GYNECOLOGY | Facility: CLINIC | Age: 58
End: 2023-08-21
Payer: MEDICAID

## 2023-08-21 NOTE — TELEPHONE ENCOUNTER
Called the pharmacy to confirm that the prescription was received and had 12 refills. The pharmacy did confirm.

## 2023-08-21 NOTE — TELEPHONE ENCOUNTER
Received refill request from patients pharmacy requesting refills on patients estradiol prescription. Please call pharmacy to verify they received the original script on 7/17/23 that included 12 refills. Thank you.

## 2023-08-22 ENCOUNTER — TELEPHONE (OUTPATIENT)
Dept: GYNECOLOGY | Facility: CLINIC | Age: 58
End: 2023-08-22
Payer: MEDICAID

## 2023-08-22 NOTE — TELEPHONE ENCOUNTER
Called patient to inform that I had spoken to the pharmacy on yesterday and a current prescription with 12 refills were sent to the pharmacy on 07/17/23. Patient advised to contact pharmacy.

## 2023-10-27 ENCOUNTER — HOSPITAL ENCOUNTER (OUTPATIENT)
Dept: RADIOLOGY | Facility: HOSPITAL | Age: 58
Discharge: HOME | End: 2023-10-27
Attending: NURSE PRACTITIONER
Payer: MEDICAID

## 2023-10-27 DIAGNOSIS — Z12.31 VISIT FOR SCREENING MAMMOGRAM: ICD-10-CM

## 2023-10-27 PROCEDURE — 77063 BREAST TOMOSYNTHESIS BI: CPT | Mod: 26,,, | Performed by: RADIOLOGY

## 2023-10-27 PROCEDURE — 77067 MAMMO DIGITAL SCREENING BILAT WITH TOMO: ICD-10-PCS | Mod: 26,,, | Performed by: RADIOLOGY

## 2023-10-27 PROCEDURE — 77067 SCR MAMMO BI INCL CAD: CPT | Mod: TC

## 2023-10-27 PROCEDURE — 77063 MAMMO DIGITAL SCREENING BILAT WITH TOMO: ICD-10-PCS | Mod: 26,,, | Performed by: RADIOLOGY

## 2023-10-27 PROCEDURE — 77067 SCR MAMMO BI INCL CAD: CPT | Mod: 26,,, | Performed by: RADIOLOGY

## 2023-11-08 PROBLEM — M47.816 LUMBAR FACET ARTHROPATHY: Status: ACTIVE | Noted: 2023-11-08

## 2024-05-09 ENCOUNTER — LAB VISIT (OUTPATIENT)
Dept: LAB | Facility: HOSPITAL | Age: 59
End: 2024-05-09
Attending: INTERNAL MEDICINE
Payer: MEDICAID

## 2024-05-09 DIAGNOSIS — Z86.010 PERSONAL HISTORY OF COLONIC POLYPS: ICD-10-CM

## 2024-05-09 DIAGNOSIS — Z12.11 SPECIAL SCREENING FOR MALIGNANT NEOPLASMS, COLON: Primary | ICD-10-CM

## 2024-05-09 LAB
HEMOCCULT SP1 STL QL: NEGATIVE
HEMOCCULT SP2 STL QL: NEGATIVE
HEMOCCULT SP3 STL QL: NEGATIVE

## 2024-05-09 PROCEDURE — 82270 OCCULT BLOOD FECES: CPT

## 2024-06-17 ENCOUNTER — TELEPHONE (OUTPATIENT)
Dept: INTERNAL MEDICINE | Facility: CLINIC | Age: 59
End: 2024-06-17

## 2024-06-17 ENCOUNTER — LAB VISIT (OUTPATIENT)
Dept: LAB | Facility: HOSPITAL | Age: 59
End: 2024-06-17
Payer: MEDICAID

## 2024-06-17 ENCOUNTER — OFFICE VISIT (OUTPATIENT)
Dept: INTERNAL MEDICINE | Facility: CLINIC | Age: 59
End: 2024-06-17
Payer: MEDICAID

## 2024-06-17 VITALS
BODY MASS INDEX: 33.61 KG/M2 | SYSTOLIC BLOOD PRESSURE: 108 MMHG | WEIGHT: 178 LBS | TEMPERATURE: 99 F | HEIGHT: 61 IN | DIASTOLIC BLOOD PRESSURE: 72 MMHG | RESPIRATION RATE: 14 BRPM | HEART RATE: 76 BPM | OXYGEN SATURATION: 98 %

## 2024-06-17 DIAGNOSIS — E88.810 METABOLIC SYNDROME X: ICD-10-CM

## 2024-06-17 DIAGNOSIS — E55.9 VITAMIN D DEFICIENCY: ICD-10-CM

## 2024-06-17 DIAGNOSIS — M54.16 LUMBAR RADICULOPATHY: ICD-10-CM

## 2024-06-17 DIAGNOSIS — E78.5 HYPERLIPIDEMIA, UNSPECIFIED HYPERLIPIDEMIA TYPE: ICD-10-CM

## 2024-06-17 DIAGNOSIS — E66.9 CLASS 1 OBESITY WITH BODY MASS INDEX (BMI) OF 33.0 TO 33.9 IN ADULT, UNSPECIFIED OBESITY TYPE, UNSPECIFIED WHETHER SERIOUS COMORBIDITY PRESENT: ICD-10-CM

## 2024-06-17 DIAGNOSIS — M54.16 LUMBAR RADICULOPATHY: Primary | ICD-10-CM

## 2024-06-17 LAB
ALBUMIN SERPL-MCNC: 4 G/DL (ref 3.5–5)
ALBUMIN/GLOB SERPL: 1.2 RATIO (ref 1.1–2)
ALP SERPL-CCNC: 56 UNIT/L (ref 40–150)
ALT SERPL-CCNC: 20 UNIT/L (ref 0–55)
ANION GAP SERPL CALC-SCNC: 9 MEQ/L
AST SERPL-CCNC: 22 UNIT/L (ref 5–34)
BASOPHILS # BLD AUTO: 0.07 X10(3)/MCL
BASOPHILS NFR BLD AUTO: 0.8 %
BILIRUB SERPL-MCNC: 0.9 MG/DL
BUN SERPL-MCNC: 16.9 MG/DL (ref 9.8–20.1)
CALCIUM SERPL-MCNC: 9.8 MG/DL (ref 8.4–10.2)
CHLORIDE SERPL-SCNC: 107 MMOL/L (ref 98–107)
CHOLEST SERPL-MCNC: 211 MG/DL
CHOLEST/HDLC SERPL: 4 {RATIO} (ref 0–5)
CO2 SERPL-SCNC: 23 MMOL/L (ref 22–29)
CREAT SERPL-MCNC: 0.92 MG/DL (ref 0.55–1.02)
CREAT/UREA NIT SERPL: 18
EOSINOPHIL # BLD AUTO: 0.2 X10(3)/MCL (ref 0–0.9)
EOSINOPHIL NFR BLD AUTO: 2.3 %
ERYTHROCYTE [DISTWIDTH] IN BLOOD BY AUTOMATED COUNT: 12.2 % (ref 11.5–17)
EST. AVERAGE GLUCOSE BLD GHB EST-MCNC: 105.4 MG/DL
GFR SERPLBLD CREATININE-BSD FMLA CKD-EPI: >60 ML/MIN/1.73/M2
GLOBULIN SER-MCNC: 3.4 GM/DL (ref 2.4–3.5)
GLUCOSE SERPL-MCNC: 98 MG/DL (ref 74–100)
HBA1C MFR BLD: 5.3 %
HCT VFR BLD AUTO: 43.4 % (ref 37–47)
HDLC SERPL-MCNC: 59 MG/DL (ref 35–60)
HGB BLD-MCNC: 14.7 G/DL (ref 12–16)
IMM GRANULOCYTES # BLD AUTO: 0.04 X10(3)/MCL (ref 0–0.04)
IMM GRANULOCYTES NFR BLD AUTO: 0.5 %
LDLC SERPL CALC-MCNC: 124 MG/DL (ref 50–140)
LYMPHOCYTES # BLD AUTO: 3.64 X10(3)/MCL (ref 0.6–4.6)
LYMPHOCYTES NFR BLD AUTO: 41.6 %
MCH RBC QN AUTO: 31.3 PG (ref 27–31)
MCHC RBC AUTO-ENTMCNC: 33.9 G/DL (ref 33–36)
MCV RBC AUTO: 92.3 FL (ref 80–94)
MONOCYTES # BLD AUTO: 0.47 X10(3)/MCL (ref 0.1–1.3)
MONOCYTES NFR BLD AUTO: 5.4 %
NEUTROPHILS # BLD AUTO: 4.33 X10(3)/MCL (ref 2.1–9.2)
NEUTROPHILS NFR BLD AUTO: 49.4 %
NRBC BLD AUTO-RTO: 0 %
PLATELET # BLD AUTO: 235 X10(3)/MCL (ref 130–400)
PMV BLD AUTO: 10.4 FL (ref 7.4–10.4)
POTASSIUM SERPL-SCNC: 4.3 MMOL/L (ref 3.5–5.1)
PROT SERPL-MCNC: 7.4 GM/DL (ref 6.4–8.3)
RBC # BLD AUTO: 4.7 X10(6)/MCL (ref 4.2–5.4)
SODIUM SERPL-SCNC: 139 MMOL/L (ref 136–145)
T4 FREE SERPL-MCNC: 0.78 NG/DL (ref 0.7–1.48)
TRIGL SERPL-MCNC: 138 MG/DL (ref 37–140)
TSH SERPL-ACNC: 2.38 UIU/ML (ref 0.35–4.94)
VLDLC SERPL CALC-MCNC: 28 MG/DL
WBC # BLD AUTO: 8.75 X10(3)/MCL (ref 4.5–11.5)

## 2024-06-17 PROCEDURE — 84439 ASSAY OF FREE THYROXINE: CPT

## 2024-06-17 PROCEDURE — 84443 ASSAY THYROID STIM HORMONE: CPT

## 2024-06-17 PROCEDURE — 80061 LIPID PANEL: CPT

## 2024-06-17 PROCEDURE — 36415 COLL VENOUS BLD VENIPUNCTURE: CPT

## 2024-06-17 PROCEDURE — 85025 COMPLETE CBC W/AUTO DIFF WBC: CPT

## 2024-06-17 PROCEDURE — 83036 HEMOGLOBIN GLYCOSYLATED A1C: CPT

## 2024-06-17 PROCEDURE — 99213 OFFICE O/P EST LOW 20 MIN: CPT | Mod: PBBFAC

## 2024-06-17 PROCEDURE — 80053 COMPREHEN METABOLIC PANEL: CPT

## 2024-06-17 RX ORDER — GABAPENTIN 600 MG/1
600 TABLET ORAL 3 TIMES DAILY
Qty: 90 TABLET | Refills: 11 | Status: SHIPPED | OUTPATIENT
Start: 2024-06-17 | End: 2025-06-17

## 2024-06-17 RX ORDER — TIRZEPATIDE 2.5 MG/.5ML
2.5 INJECTION, SOLUTION SUBCUTANEOUS
Qty: 2 ML | Refills: 0 | Status: SHIPPED | OUTPATIENT
Start: 2024-06-17 | End: 2024-06-19

## 2024-06-17 NOTE — TELEPHONE ENCOUNTER
----- Message from Ty Hager MD sent at 6/17/2024  1:25 PM CDT -----  Please let patiet know that tsh was normal  ----- Message -----  From: Lab, Background User  Sent: 6/17/2024   9:51 AM CDT  To: Ty Hager MD

## 2024-06-17 NOTE — TELEPHONE ENCOUNTER
Spoke with patient over the phone. Name and  confirmed. I gave her the test result from the provider. She verbalized understanding. Call ended.

## 2024-06-17 NOTE — PROGRESS NOTES
INTERNAL MEDICINE RESIDENT CLINIC  CLINIC NOTE    Patient Name: An Ly  YOB: 1965    PRESENTING HISTORY    PMHx: vasomotor symptoms, HLD, lumbar radiculopathy    History of Present Illness:  Ms. An Ly is a 59 y.o. female previously seen by Dr. Castellanos, last seen 3/2022.    Last Note: Patient reports overall doing better. Patient was evaluated by neurosurgeon in [place] and was told that she would need surgery. Patient refused surgery and opted for CSI instead. She had her first CSI in her lower back in Dec 2021 and completed PT since then. She reports significant improvement in her back pain. Not complaining of any back pain today. No saddle anesthesia, no urinary or bowel incontinence. no other numbness/tingling in any of her other extremities. 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 She denies any lower leg swelling, calf pain, redness. She also is no longer taking HRT since July 2021. Today reports symptoms of vaginal itching, dryness and pain with intercourse x 2 weeks. Reports she was seen at our [facility] urgent care, tested negative for STDs, negative for UTI, negative for yeast infection. Reports she tried to contact gynecology clinic and was told they would have to give her a call back. She is going on a trip to [place] on Sunday and requesting something for her trip. Discussed that I would prescribe her topical estrogen cream for 2 weeks to help with vaginal atrophy symptoms during her trip and instructed her to notify gynecology as well.    LOV: Patient states she is doing well.  The ablation on her back has been effective, however she thinks that it is starting to wear off.  While she walks eventually her legs become like cement more so recently.  She tells me that the intervention is good for 6 months to 2 years and I advised her to get back in with the group that allowed her to undergo this ablation for re-evaluation.   Otherwise she takes her gabapentin which allows her to function well.  She does not claim any weight loss, fevers, night sweats, back pain at night.    Today: Has been feeling down. Wants weight loss assistance. Had ablation again recently. Almost time likely for NSGY.    CURRENT MEDICATIONS      Current Outpatient Medications on File Prior to Visit   Medication Sig    estradioL (ESTRACE) 0.5 MG tablet Take 1 tablet (0.5 mg total) by mouth once daily.    medroxyPROGESTERone (PROVERA) 2.5 MG tablet Take 1 tablet (2.5 mg total) by mouth once daily.    [DISCONTINUED] gabapentin (NEURONTIN) 600 MG tablet Take 1 tablet (600 mg total) by mouth 3 (three) times daily.     No current facility-administered medications on file prior to visit.         Review of Systems   Constitutional:  Negative for chills, fever and weight loss.   HENT:  Negative for hearing loss.    Eyes:  Negative for discharge.   Respiratory:  Negative for cough and wheezing.    Cardiovascular:  Negative for chest pain and palpitations.   Gastrointestinal:  Negative for abdominal pain, blood in stool, constipation, diarrhea and vomiting.   Genitourinary:  Negative for dysuria, frequency and hematuria.   Musculoskeletal:  Negative for neck pain.   Neurological:  Negative for dizziness, weakness and headaches.   Endo/Heme/Allergies:  Negative for polydipsia.       PAST HISTORY:     Past Medical History:   Diagnosis Date    Arthritis        Past Surgical History:   Procedure Laterality Date     SECTION      COLONOSCOPY W/ BIOPSIES  2019    Dima Call MD    DESTRUCTION, NERVE, FACET JOINT, LUMBOSACRAL, USING NEUROLYTIC AGENT, WITH FLUOROSCOPIC GUIDANCE Bilateral 2024    Procedure: DESTRUCTION, NERVE, FACET JOINT, LUMBOSACRAL, USING NEUROLYTIC AGENT, WITH FLUOROSCOPIC GUIDANCE;  Surgeon: Chet Neal MD;  Location: [facility] MAIN OR;  Service: Pain Management;  Laterality: Bilateral;  L5-S3    EPIDURAL STEROID INJECTION INTO LUMBAR  SPINE N/A 04/04/2022    Procedure: Injection-steroid-epidural-lumbar;  Surgeon: Chet Neal MD;  Location: [facility] MAIN OR;  Service: Pain Management;  Laterality: N/A;    EPIDURAL STEROID INJECTION INTO LUMBAR SPINE N/A 08/08/2022    Procedure: Injection-steroid-epidural-lumbar;  Surgeon: Chet Neal MD;  Location: [facility] MAIN OR;  Service: Pain Management;  Laterality: N/A;    INJECTION OF ANESTHETIC AGENT INTO SACROILIAC JOINT Left 01/24/2022    Procedure: BLOCK, SACROILIAC JOINT;  Surgeon: Chet Neal MD;  Location: [facility] MAIN OR;  Service: Pain Management;  Laterality: Left;    INJECTION OF ANESTHETIC AGENT INTO SACROILIAC JOINT Right 10/12/2022    Procedure: BLOCK, SACROILIAC JOINT;  Surgeon: Chet Neal MD;  Location: [facility] MAIN OR;  Service: Pain Management;  Laterality: Right;    RADIOFREQUENCY ABLATION Bilateral 12/19/2022    Procedure: Radiofrequency Ablation sacral lateral branches;  Surgeon: Chet Neal MD;  Location: [facility] MAIN OR;  Service: Pain Management;  Laterality: Bilateral;    RADIOFREQUENCY ABLATION Bilateral 11/8/2023    Procedure: Radiofrequency Ablation L5-S3 sacral branches;  Surgeon: Chet Neal MD;  Location: [facility] MAIN OR;  Service: Pain Management;  Laterality: Bilateral;    RADIOFREQUENCY ABLATION, NERVE, SPINAL, LUMBOSACRAL Bilateral 6/12/2024    Procedure: RADIOFREQUENCY ABLATION, NERVE, SPINAL, LUMBOSACRAL, EACH ADD'L LEVEL;  Surgeon: Chet Neal MD;  Location: [facility] MAIN OR;  Service: Pain Management;  Laterality: Bilateral;  L5-S3       Family History   Problem Relation Name Age of Onset    Lung cancer Father         Social History     Socioeconomic History    Marital status: Single   Tobacco Use    Smoking status: Never    Smokeless tobacco: Never   Substance and Sexual Activity    Alcohol use: Yes     Alcohol/week: 1.0 standard drink of alcohol     Types: 1 Glasses of wine per week     Comment: occasional    Drug use: Yes  "    Types: Marijuana    Sexual activity: Yes     Birth control/protection: None     Social Determinants of Health     Financial Resource Strain: Medium Risk (6/17/2024)    Overall Financial Resource Strain (CARDIA)     Difficulty of Paying Living Expenses: Somewhat hard   Food Insecurity: Food Insecurity Present (6/17/2024)    Hunger Vital Sign     Worried About Running Out of Food in the Last Year: Never true     Ran Out of Food in the Last Year: Sometimes true   Transportation Needs: No Transportation Needs (6/17/2024)    TRANSPORTATION NEEDS     Transportation : No   Physical Activity: Insufficiently Active (6/17/2024)    Exercise Vital Sign     Days of Exercise per Week: 2 days     Minutes of Exercise per Session: 60 min   Stress: No Stress Concern Present (6/17/2024)    [employer] [place] [employer] - Occupational Stress Questionnaire     Feeling of Stress : Not at all   Housing Stability: High Risk (6/17/2024)    Housing Stability Vital Sign     Unable to Pay for Housing in the Last Year: Yes     Homeless in the Last Year: No       Review of patient's allergies indicates:  No Known Allergies    OBJECTIVE:   Vital Signs:  Vitals:    06/17/24 0940   BP: 108/72   Pulse: 76   Resp: 14   Temp: 98.6 °F (37 °C)   TempSrc: Oral   SpO2: 98%   Weight: 80.7 kg (178 lb)   Height: 5' 1" (1.549 m)         Recent Results (from the past 24 hour(s))   Comprehensive Metabolic Panel    Collection Time: 06/17/24  9:26 AM   Result Value Ref Range    Sodium 139 136 - 145 mmol/L    Potassium 4.3 3.5 - 5.1 mmol/L    Chloride 107 98 - 107 mmol/L    CO2 23 22 - 29 mmol/L    Glucose 98 74 - 100 mg/dL    Blood Urea Nitrogen 16.9 9.8 - 20.1 mg/dL    Creatinine 0.92 0.55 - 1.02 mg/dL    Calcium 9.8 8.4 - 10.2 mg/dL    Protein Total 7.4 6.4 - 8.3 gm/dL    Albumin 4.0 3.5 - 5.0 g/dL    Globulin 3.4 2.4 - 3.5 gm/dL    Albumin/Globulin Ratio 1.2 1.1 - 2.0 ratio    Bilirubin Total 0.9 <=1.5 mg/dL    ALP 56 40 - 150 unit/L    ALT 20 0 " - 55 unit/L    AST 22 5 - 34 unit/L    eGFR >60 mL/min/1.73/m2    Anion Gap 9.0 mEq/L    BUN/Creatinine Ratio 18    Lipid Panel    Collection Time: 06/17/24  9:26 AM   Result Value Ref Range    Cholesterol Total 211 (H) <=200 mg/dL    HDL Cholesterol 59 35 - 60 mg/dL    Triglyceride 138 37 - 140 mg/dL    Cholesterol/HDL Ratio 4 0 - 5    Very Low Density Lipoprotein 28     LDL Cholesterol 124.00 50.00 - 140.00 mg/dL   Hemoglobin A1C    Collection Time: 06/17/24  9:26 AM   Result Value Ref Range    Hemoglobin A1c 5.3 <=7.0 %    Estimated Average Glucose 105.4 mg/dL   CBC with Differential    Collection Time: 06/17/24  9:26 AM   Result Value Ref Range    WBC 8.75 4.50 - 11.50 x10(3)/mcL    RBC 4.70 4.20 - 5.40 x10(6)/mcL    Hgb 14.7 12.0 - 16.0 g/dL    Hct 43.4 37.0 - 47.0 %    MCV 92.3 80.0 - 94.0 fL    MCH 31.3 (H) 27.0 - 31.0 pg    MCHC 33.9 33.0 - 36.0 g/dL    RDW 12.2 11.5 - 17.0 %    Platelet 235 130 - 400 x10(3)/mcL    MPV 10.4 7.4 - 10.4 fL    Neut % 49.4 %    Lymph % 41.6 %    Mono % 5.4 %    Eos % 2.3 %    Basophil % 0.8 %    Lymph # 3.64 0.6 - 4.6 x10(3)/mcL    Neut # 4.33 2.1 - 9.2 x10(3)/mcL    Mono # 0.47 0.1 - 1.3 x10(3)/mcL    Eos # 0.20 0 - 0.9 x10(3)/mcL    Baso # 0.07 <=0.2 x10(3)/mcL    IG# 0.04 0 - 0.04 x10(3)/mcL    IG% 0.5 %    NRBC% 0.0 %         Physical Exam  Constitutional:       Appearance: Normal appearance. She is normal weight.   HENT:      Head: Normocephalic and atraumatic.      Nose: Nose normal.      Mouth/Throat:      Mouth: Mucous membranes are dry.   Eyes:      Extraocular Movements: Extraocular movements intact.      Conjunctiva/sclera: Conjunctivae normal.      Pupils: Pupils are equal, round, and reactive to light.   Cardiovascular:      Rate and Rhythm: Normal rate and regular rhythm.      Pulses: Normal pulses.      Heart sounds: Normal heart sounds.   Pulmonary:      Effort: Pulmonary effort is normal.      Breath sounds: Normal breath sounds.   Abdominal:      General:  "Abdomen is flat.      Palpations: Abdomen is soft.   Musculoskeletal:         General: Normal range of motion.      Cervical back: Normal range of motion.   Skin:     General: Skin is warm.      Capillary Refill: Capillary refill takes less than 2 seconds.   Neurological:      General: No focal deficit present.      Mental Status: She is alert and oriented to person, place, and time.   Psychiatric:         Mood and Affect: Mood normal.         Behavior: Behavior normal.       Laboratory  CMP:   Recent Labs   Lab 11/09/21  0712 10/05/22  1700 11/23/22  0706 06/17/24  0926   Sodium 140 141 142 139   Potassium 3.8 3.5 4.3 4.3   CO2 23 25 23 23   Blood Urea Nitrogen 17.5 18.7 16.6 16.9   Creatinine 0.69 0.93 0.88 0.92   Glucose 94 115 H 95 98   Calcium 9.8 9.5 9.4 9.8   Albumin 4.1 4.3 4.1 4.0   Bilirubin Total 0.8 0.9 0.9 0.9   Bilirubin Direct 0.3  --   --   --    Bilirubin Indirect 0.50  --   --   --    AST 23 20 22 22   ALT 21 17 19 20   ALP 55 70 64 56     CBC:   Recent Labs   Lab 10/05/22  1700 11/23/22  0706 06/17/24  0926   WBC 10.1 7.7 8.75   Neut # 5.5 3.7 4.33   RBC 4.74 4.77 4.70   Hgb 14.4 14.4 14.7   Hct 44.3 44.2 43.4   Platelet 236 237 235   MCV 93.5 92.7 92.3   RDW 11.9 11.9 12.2     FLP:   Recent Labs   Lab 06/19/23  0840 06/17/24  0926   Cholesterol Total 211 H 211 H   HDL Cholesterol 57 59   LDL Cholesterol 130.00 124.00   Triglyceride 119 138     DM:   Recent Labs   Lab 10/05/22  1700 11/23/22  0706 06/17/24  0926   Hemoglobin A1c  --  5.3 5.3   Creatinine 0.93 0.88 0.92     Thyroid:       Invalid input(s): "[id number]"  Anemia:   Recent Labs   Lab 06/17/24  0926   Hgb 14.7   Hct 43.4             ASSESSMENT & PLAN:     Hx of vasomotor symptoms  followed by GYN, last seen by GYN on 7/2022 - prescribed:  -     estradioL (ESTRACE) 0.5 MG tablet; Take 1 tablet (0.5 mg total) by mouth once daily.  -     medroxyPROGESTERone (PROVERA) 2.5 MG tablet; Take 1 tablet (2.5 mg total) by mouth once " daily.    Hypercholesterolemia  Repeat Lipid panel 6/17/2024, ,   ASCVD risk 1.7% no statin therapy recommended at last visit      Chronic lower lumbar radiculopathy  Sciatic nerve pain  - XR lumbar spine done 12/2020 no listhesis, Possible L5-S1 facet overgrowth. Lumbar disc spaces are preserved.  - XR pelvis showed degenerative changes; ESR, CRP, SORAYA, HLA-B27 all negative  - continue gabapentin 600mg TID  - follows with neurosurgery in [facility] - s/p Radiofrequency Ablation sacral lateral branches (Bilateral) L5-S3 on 12/19/2022    Obesity  - Trial Mounjaro 2.5 weekly, advised on side effects and risks  - no hx of pancreatic issues, thyroid issues    Vit D deficiency  -vit D level increased to now 30.7 -> 27.2   -Rx for vit D3 1000IU daily - to continue (in addition she is taking multivitamin with 800U daily OTC which I instructed her to continue as well)    Health Maintenance Due   Topic Date Due    COVID-19 Vaccine (1 - 2023-24 season) Never done           Health Maintenance/ Wellness  TDAP: UTD  Zoster: UTD  Influenza: refused  HIV/Hep screening: negative on 12/2020  CAGE screening: done 8.14.20  Influenza vaccine: pt refused today  Screening colonoscopy: normal colonoscopy on 3/29/2021  Pap smear: 7/2022 done, repeat 7/2025  Mammogram: MG: BI-RADS1, negative 10/30/23 - Repeat 10/2024    RTC 1 Year. Labs then. Medications renewed.    Future Appointments     Future Appointments   Date Time Provider Department Center   7/22/2024  7:50 AM Danya Tesfaye ANP [facility]   8/13/2024  1:30 PM Chet Neal MD [facility]   6/16/2025  9:30 AM Ty Hager MD [facility] IM RES [facility]      No orders of the defined types were placed in this encounter.        Discussed with Dr. Rocha - Staff Attestation to Follow    Ty Hager MD  [facility] Internal Medicine PGY-2    Answers submitted by the patient for this visit:  Review of Systems Questionnaire (Submitted on  6/10/2024)  activity change: No  unexpected weight change: Yes  rhinorrhea: No  trouble swallowing: No  visual disturbance: No  chest tightness: No  polyuria: Yes  difficulty urinating: No  menstrual problem: No  joint swelling: No  arthralgias: No  confusion: No  dysphoric mood: No

## 2024-06-19 ENCOUNTER — TELEPHONE (OUTPATIENT)
Dept: INTERNAL MEDICINE | Facility: CLINIC | Age: 59
End: 2024-06-19
Payer: MEDICAID

## 2024-06-19 RX ORDER — TIRZEPATIDE 2.5 MG/.5ML
2.5 INJECTION, SOLUTION SUBCUTANEOUS
Qty: 2 ML | Refills: 0 | Status: SHIPPED | OUTPATIENT
Start: 2024-06-19 | End: 2024-07-19

## 2024-07-20 DIAGNOSIS — R23.2 HOT FLASHES: ICD-10-CM

## 2024-07-23 RX ORDER — ESTRADIOL 0.5 MG/1
0.5 TABLET ORAL
Qty: 30 TABLET | Refills: 1 | Status: SHIPPED | OUTPATIENT
Start: 2024-07-23

## 2024-08-19 DIAGNOSIS — R23.2 HOT FLASHES: ICD-10-CM

## 2024-08-19 RX ORDER — MEDROXYPROGESTERONE ACETATE 2.5 MG/1
2.5 TABLET ORAL
Qty: 30 TABLET | Refills: 0 | Status: SHIPPED | OUTPATIENT
Start: 2024-08-19

## 2024-08-27 ENCOUNTER — OFFICE VISIT (OUTPATIENT)
Dept: GYNECOLOGY | Facility: CLINIC | Age: 59
End: 2024-08-27
Payer: MEDICAID

## 2024-08-27 VITALS
HEART RATE: 71 BPM | RESPIRATION RATE: 20 BRPM | WEIGHT: 180.19 LBS | SYSTOLIC BLOOD PRESSURE: 124 MMHG | TEMPERATURE: 98 F | HEIGHT: 61 IN | DIASTOLIC BLOOD PRESSURE: 82 MMHG | OXYGEN SATURATION: 100 % | BODY MASS INDEX: 34.02 KG/M2

## 2024-08-27 DIAGNOSIS — N95.1 VAGINAL DRYNESS, MENOPAUSAL: ICD-10-CM

## 2024-08-27 DIAGNOSIS — R23.2 HOT FLASHES: ICD-10-CM

## 2024-08-27 DIAGNOSIS — Z12.31 VISIT FOR SCREENING MAMMOGRAM: ICD-10-CM

## 2024-08-27 DIAGNOSIS — Z01.419 ENCOUNTER FOR ANNUAL ROUTINE GYNECOLOGICAL EXAMINATION: Primary | ICD-10-CM

## 2024-08-27 PROCEDURE — 1159F MED LIST DOCD IN RCRD: CPT | Mod: CPTII,,, | Performed by: NURSE PRACTITIONER

## 2024-08-27 PROCEDURE — 99213 OFFICE O/P EST LOW 20 MIN: CPT | Mod: PBBFAC | Performed by: NURSE PRACTITIONER

## 2024-08-27 PROCEDURE — 3008F BODY MASS INDEX DOCD: CPT | Mod: CPTII,,, | Performed by: NURSE PRACTITIONER

## 2024-08-27 PROCEDURE — 3074F SYST BP LT 130 MM HG: CPT | Mod: CPTII,,, | Performed by: NURSE PRACTITIONER

## 2024-08-27 PROCEDURE — 3079F DIAST BP 80-89 MM HG: CPT | Mod: CPTII,,, | Performed by: NURSE PRACTITIONER

## 2024-08-27 PROCEDURE — 99396 PREV VISIT EST AGE 40-64: CPT | Mod: S$PBB,,, | Performed by: NURSE PRACTITIONER

## 2024-08-27 PROCEDURE — 3044F HG A1C LEVEL LT 7.0%: CPT | Mod: CPTII,,, | Performed by: NURSE PRACTITIONER

## 2024-08-27 RX ORDER — MEDROXYPROGESTERONE ACETATE 2.5 MG/1
2.5 TABLET ORAL DAILY
Qty: 30 TABLET | Refills: 12 | Status: SHIPPED | OUTPATIENT
Start: 2024-08-27

## 2024-08-27 RX ORDER — ESTRADIOL 0.5 MG/1
0.5 TABLET ORAL DAILY
Qty: 30 TABLET | Refills: 12 | Status: SHIPPED | OUTPATIENT
Start: 2024-08-27

## 2024-08-27 NOTE — PROGRESS NOTES
[facility] -  Gynecology / Women's Health Clinic      Subjective:       Patient ID: An Ly is a 59 y.o. female.    Chief Complaint:  Gynecologic Exam    History of Present Illness  Pt is  here for annual GYN exam. Pt has been post menopausal since age 48yo. Last pap--NIL and HPV neg. Denies any abn. paps in the past. Denies discharge, itching or bleeding. Denies abd. or pelvic pain. Denies breast or urinary complaints. MG-10/27/23-BIRADS 1. Pt has hx of bothersome hot flashes, night sweats, and mood swings in the past. Originally she was placed on Activella (oral) and was doing well, but had to switch due to insurance reasons. She was switched to Prempro and stopped Prempro after about about 2 months due to breast pain. She was being treated with estradiol and provera with multiple attempts to stop over the last few years. She has stopped over  as she was not experiencing hot flashes however once she stopped hot flashes resumed. She also c/o vaginal dryness, attempted Premarin cream however does not feel that it helped.  She is currently taking daily Estradiol 0.5 and Provera 2.5. Also on neurontin for back pain. She currently declines use of SSRI/SNRI for treatment. She denies any medical hx, no hx of HTN, DVT, PE, CP, SOB, MI or CVA or breast cancer. She is a non smoker. Lipids being monitored by PCP. Denies fly hx of breast, ovarian, uterine or colon cancer. FIT positive in -colonoscopy  which only showed diverticulosis, repeat in 10 years.       GYN & OB History  No LMP recorded. Patient is postmenopausal.   Date of Last Pap: 2022    Review of patient's allergies indicates:  No Known Allergies  Past Medical History:   Diagnosis Date    Arthritis      OB History    Para Term  AB Living   2 2           SAB IAB Ectopic Multiple Live Births                  # Outcome Date GA Lbr Noam/2nd Weight Sex Type Anes PTL Lv   2 Para            1 Para      "            Review of Systems  Review of Systems    Negative except for pertinent findings for positives per HPI     Objective:    Physical Exam    /82 (BP Location: Right arm, Patient Position: Sitting, BP Method: Medium (Automatic))   Pulse 71   Temp 98 °F (36.7 °C) (Oral)   Resp 20   Ht 5' 1" (1.549 m)   Wt 81.7 kg (180 lb 3.2 oz)   SpO2 100%   BMI 34.05 kg/m²   GENERAL: Well-developed female. No acute distress.    SKIN: Normal to inspection, warm and intact.  BREASTS: No rashes or erythema. No masses, lumps, discharge, tenderness.  VULVA: General appearance normal; external genitalia with no lesions or erythema.  VAGINA: Mucosa/vaginal vault pink, no abnormal discharge or lesions.  CERVIX: Pink, nulliparous appearing os, no erythema or abnormal discharge.  BIMANUAL EXAM: reveals a 10 week-sized uterus. The uterus is non tender. Alex adnexa reveal no tenderness.  PSYCHIATRIC: Patient is oriented to person, place, and time. Mood and affect are normal.    Assessment:         ICD-10-CM ICD-9-CM   1. Encounter for annual routine gynecological examination  Z01.419 V72.31   2. Visit for screening mammogram  Z12.31 V76.12   3. Hot flashes  R23.2 782.62   4. Vaginal dryness, menopausal  N95.1 627.2     Plan:   An was seen today for gynecologic exam.    Diagnoses and all orders for this visit:    Encounter for annual routine gynecological examination    Visit for screening mammogram  -     Mammo Digital Screening Bilat w/ Bi; Future    Hot flashes  -     estradioL (ESTRACE) 0.5 MG tablet; Take 1 tablet (0.5 mg total) by mouth once daily.  -     medroxyPROGESTERone (PROVERA) 2.5 MG tablet; Take 1 tablet (2.5 mg total) by mouth once daily.    Vaginal dryness, menopausal    Pelvic today, pap utd per ACOG  MG ordered  Refill of HRT, pt will attempt to wean off over this year  Declines Premarin cream, Info for Replens given, water base lubricant for all sexual encounters  Follow up in about 1 year (around " 8/27/2025) for annual exam.

## 2024-10-31 ENCOUNTER — HOSPITAL ENCOUNTER (OUTPATIENT)
Dept: RADIOLOGY | Facility: HOSPITAL | Age: 59
Discharge: HOME | End: 2024-10-31
Attending: NURSE PRACTITIONER
Payer: MEDICAID

## 2024-10-31 DIAGNOSIS — Z12.31 VISIT FOR SCREENING MAMMOGRAM: ICD-10-CM

## 2024-10-31 PROCEDURE — 77063 BREAST TOMOSYNTHESIS BI: CPT | Mod: TC

## 2024-10-31 PROCEDURE — 77067 SCR MAMMO BI INCL CAD: CPT | Mod: TC

## 2025-05-02 ENCOUNTER — LAB VISIT (OUTPATIENT)
Dept: LAB | Facility: HOSPITAL | Age: 60
End: 2025-05-02
Attending: INTERNAL MEDICINE
Payer: MEDICAID

## 2025-05-02 DIAGNOSIS — Z12.11 SPECIAL SCREENING FOR MALIGNANT NEOPLASMS, COLON: ICD-10-CM

## 2025-05-02 DIAGNOSIS — Z86.0100 HX OF COLONIC POLYPS: Primary | ICD-10-CM

## 2025-05-02 LAB
COLOR STL: NORMAL
CONSISTENCY STL: NORMAL
HEMOCCULT SP1 STL QL: NEGATIVE
HEMOCCULT SP2 STL QL: NEGATIVE
HEMOCCULT SP3 STL QL: NEGATIVE

## 2025-05-02 PROCEDURE — 82270 OCCULT BLOOD FECES: CPT

## 2025-05-09 RX ORDER — LORATADINE 10 MG/1
1 TABLET ORAL EVERY MORNING
COMMUNITY
Start: 2024-12-01 | End: 2025-05-12

## 2025-05-09 RX ORDER — FLUTICASONE PROPIONATE 50 MCG
1 SPRAY, SUSPENSION (ML) NASAL DAILY
COMMUNITY
Start: 2024-12-01 | End: 2025-05-12

## 2025-05-12 ENCOUNTER — LAB VISIT (OUTPATIENT)
Dept: LAB | Facility: HOSPITAL | Age: 60
End: 2025-05-12
Payer: MEDICAID

## 2025-05-12 ENCOUNTER — OFFICE VISIT (OUTPATIENT)
Dept: INTERNAL MEDICINE | Facility: CLINIC | Age: 60
End: 2025-05-12
Payer: MEDICAID

## 2025-05-12 VITALS
TEMPERATURE: 98 F | HEIGHT: 61 IN | BODY MASS INDEX: 33.3 KG/M2 | HEART RATE: 85 BPM | DIASTOLIC BLOOD PRESSURE: 72 MMHG | WEIGHT: 176.38 LBS | SYSTOLIC BLOOD PRESSURE: 111 MMHG | OXYGEN SATURATION: 100 % | RESPIRATION RATE: 16 BRPM

## 2025-05-12 DIAGNOSIS — E66.811 CLASS 1 OBESITY WITH BODY MASS INDEX (BMI) OF 33.0 TO 33.9 IN ADULT, UNSPECIFIED OBESITY TYPE, UNSPECIFIED WHETHER SERIOUS COMORBIDITY PRESENT: ICD-10-CM

## 2025-05-12 DIAGNOSIS — E78.5 HYPERLIPIDEMIA, UNSPECIFIED HYPERLIPIDEMIA TYPE: ICD-10-CM

## 2025-05-12 DIAGNOSIS — E55.9 VITAMIN D DEFICIENCY: ICD-10-CM

## 2025-05-12 DIAGNOSIS — M54.16 LUMBAR RADICULOPATHY: Primary | ICD-10-CM

## 2025-05-12 DIAGNOSIS — M54.16 LUMBAR RADICULOPATHY: ICD-10-CM

## 2025-05-12 PROBLEM — E66.9 OBESITY: Status: ACTIVE | Noted: 2025-05-12

## 2025-05-12 PROBLEM — E78.1 HYPERTRIGLYCERIDEMIA: Status: ACTIVE | Noted: 2025-05-12

## 2025-05-12 PROBLEM — N95.1 HOT FLASHES DUE TO MENOPAUSE: Status: ACTIVE | Noted: 2025-05-12

## 2025-05-12 PROBLEM — N95.2 VAGINAL ATROPHY: Status: ACTIVE | Noted: 2025-05-12

## 2025-05-12 LAB
BASOPHILS # BLD AUTO: 0.07 X10(3)/MCL
BASOPHILS NFR BLD AUTO: 0.9 %
CHOLEST SERPL-MCNC: 197 MG/DL
CHOLEST/HDLC SERPL: 3 {RATIO} (ref 0–5)
EOSINOPHIL # BLD AUTO: 0.19 X10(3)/MCL (ref 0–0.9)
EOSINOPHIL NFR BLD AUTO: 2.5 %
ERYTHROCYTE [DISTWIDTH] IN BLOOD BY AUTOMATED COUNT: 12.9 % (ref 11.5–17)
FERRITIN SERPL-MCNC: 109.22 NG/ML (ref 4.63–204)
HCT VFR BLD AUTO: 43.3 % (ref 37–47)
HDLC SERPL-MCNC: 67 MG/DL (ref 35–60)
HGB BLD-MCNC: 14.1 G/DL (ref 12–16)
IMM GRANULOCYTES # BLD AUTO: 0.03 X10(3)/MCL (ref 0–0.04)
IMM GRANULOCYTES NFR BLD AUTO: 0.4 %
IRON SATN MFR SERPL: 30 % (ref 20–50)
IRON SERPL-MCNC: 89 UG/DL (ref 50–170)
LDLC SERPL CALC-MCNC: 115 MG/DL (ref 50–140)
LYMPHOCYTES # BLD AUTO: 3.5 X10(3)/MCL (ref 0.6–4.6)
LYMPHOCYTES NFR BLD AUTO: 46.7 %
MCH RBC QN AUTO: 30.5 PG (ref 27–31)
MCHC RBC AUTO-ENTMCNC: 32.6 G/DL (ref 33–36)
MCV RBC AUTO: 93.5 FL (ref 80–94)
MONOCYTES # BLD AUTO: 0.46 X10(3)/MCL (ref 0.1–1.3)
MONOCYTES NFR BLD AUTO: 6.1 %
NEUTROPHILS # BLD AUTO: 3.25 X10(3)/MCL (ref 2.1–9.2)
NEUTROPHILS NFR BLD AUTO: 43.4 %
NRBC BLD AUTO-RTO: 0 %
PLATELET # BLD AUTO: 225 X10(3)/MCL (ref 130–400)
PMV BLD AUTO: 10.4 FL (ref 7.4–10.4)
RBC # BLD AUTO: 4.63 X10(6)/MCL (ref 4.2–5.4)
T4 FREE SERPL-MCNC: 0.9 NG/DL (ref 0.7–1.48)
TIBC SERPL-MCNC: 211 UG/DL (ref 70–310)
TIBC SERPL-MCNC: 300 UG/DL (ref 250–450)
TRANSFERRIN SERPL-MCNC: 277 MG/DL (ref 180–382)
TRIGL SERPL-MCNC: 75 MG/DL (ref 37–140)
TSH SERPL-ACNC: 1.23 UIU/ML (ref 0.35–4.94)
VLDLC SERPL CALC-MCNC: 15 MG/DL
WBC # BLD AUTO: 7.5 X10(3)/MCL (ref 4.5–11.5)

## 2025-05-12 PROCEDURE — 84439 ASSAY OF FREE THYROXINE: CPT

## 2025-05-12 PROCEDURE — 82728 ASSAY OF FERRITIN: CPT

## 2025-05-12 PROCEDURE — 83550 IRON BINDING TEST: CPT

## 2025-05-12 PROCEDURE — 99213 OFFICE O/P EST LOW 20 MIN: CPT | Mod: PBBFAC

## 2025-05-12 PROCEDURE — 36415 COLL VENOUS BLD VENIPUNCTURE: CPT

## 2025-05-12 PROCEDURE — 80061 LIPID PANEL: CPT

## 2025-05-12 PROCEDURE — 85025 COMPLETE CBC W/AUTO DIFF WBC: CPT

## 2025-05-12 PROCEDURE — 84443 ASSAY THYROID STIM HORMONE: CPT

## 2025-05-12 RX ORDER — PYRIDOXINE HCL (VITAMIN B6) 100 MG
100 TABLET ORAL DAILY
COMMUNITY

## 2025-05-12 RX ORDER — GABAPENTIN 600 MG/1
600 TABLET ORAL 3 TIMES DAILY
Qty: 90 TABLET | Refills: 11 | Status: SHIPPED | OUTPATIENT
Start: 2025-05-12 | End: 2026-05-12

## 2025-05-12 RX ORDER — LANOLIN ALCOHOL/MO/W.PET/CERES
100 CREAM (GRAM) TOPICAL DAILY
COMMUNITY

## 2025-05-12 NOTE — PROGRESS NOTES
I have reviewed and agree with the resident's findings, including all diagnostic interpretations and plans as written.    Carrie Martinez MD

## 2025-05-12 NOTE — PROGRESS NOTES
INTERNAL MEDICINE RESIDENT CLINIC  CLINIC NOTE    Patient Name: An Ly  YOB: 1965    PRESENTING HISTORY    PMHx: vasomotor symptoms, HLD, lumbar radiculopathy    History of Present Illness:  Ms. An Ly is a 60 y.o. female follow up to clinic.    Last Note: Patient reports overall doing better. Patient was evaluated by neurosurgeon in [place] and was told that she would need surgery. Patient refused surgery and opted for CSI instead. She had her first CSI in her lower back in Dec 2021 and completed PT since then. She reports significant improvement in her back pain. Not complaining of any back pain today. No saddle anesthesia, no urinary or bowel incontinence. no other numbness/tingling in any of her other extremities. 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 She denies any lower leg swelling, calf pain, redness. She also is no longer taking HRT since July 2021. Today reports symptoms of vaginal itching, dryness and pain with intercourse x 2 weeks. Reports she was seen at our [facility] urgent care, tested negative for STDs, negative for UTI, negative for yeast infection. Reports she tried to contact gynecology clinic and was told they would have to give her a call back. She is going on a trip to [place] on Sunday and requesting something for her trip. Discussed that I would prescribe her topical estrogen cream for 2 weeks to help with vaginal atrophy symptoms during her trip and instructed her to notify gynecology as well.    LOV: Patient states she is doing well.  The ablation on her back has been effective, however she thinks that it is starting to wear off.  While she walks eventually her legs become like cement more so recently.  She tells me that the intervention is good for 6 months to 2 years and I advised her to get back in with the group that allowed her to undergo this ablation for re-evaluation.  Otherwise she takes her gabapentin  which allows her to function well.  She does not claim any weight loss, fevers, night sweats, back pain at night.    Has been feeling down. Wants weight loss assistance. Had ablation again recently. Almost time likely for SEUN.    2025: Doing well other than her back. Has appointment with NSGY soon. Advised her to discuss spinal stimulator.    CURRENT MEDICATIONS      Current Outpatient Medications on File Prior to Visit   Medication Sig    fluticasone propionate (FLONASE) 50 mcg/actuation nasal spray 1 spray by Each Nostril route Daily.    loratadine (CLARITIN) 10 mg tablet Take 1 tablet by mouth every morning.    estradioL (ESTRACE) 0.5 MG tablet Take 1 tablet (0.5 mg total) by mouth once daily. (Patient not taking: Reported on 2024)    gabapentin (NEURONTIN) 600 MG tablet Take 1 tablet (600 mg total) by mouth 3 (three) times daily.    medroxyPROGESTERone (PROVERA) 2.5 MG tablet Take 1 tablet (2.5 mg total) by mouth once daily. (Patient not taking: Reported on 2024)     No current facility-administered medications on file prior to visit.         Review of Systems   Constitutional:  Negative for chills, fever and weight loss.   HENT:  Negative for hearing loss.    Eyes:  Negative for discharge.   Respiratory:  Negative for cough and wheezing.    Cardiovascular:  Negative for chest pain and palpitations.   Gastrointestinal:  Negative for abdominal pain, blood in stool, constipation, diarrhea and vomiting.   Genitourinary:  Negative for dysuria, frequency and hematuria.   Musculoskeletal:  Negative for neck pain.   Neurological:  Negative for dizziness, weakness and headaches.   Endo/Heme/Allergies:  Negative for polydipsia.       PAST HISTORY:     Past Medical History:   Diagnosis Date    Arthritis        Past Surgical History:   Procedure Laterality Date     SECTION      COLONOSCOPY W/ BIOPSIES  2019    Dima Call MD    DESTRUCTION, NERVE, FACET JOINT, LUMBOSACRAL, USING NEUROLYTIC  AGENT, WITH FLUOROSCOPIC GUIDANCE Bilateral 6/12/2024    Procedure: DESTRUCTION, NERVE, FACET JOINT, LUMBOSACRAL, USING NEUROLYTIC AGENT, WITH FLUOROSCOPIC GUIDANCE;  Surgeon: Chet Neal MD;  Location: [facility] MAIN OR;  Service: Pain Management;  Laterality: Bilateral;  L5-S3    EPIDURAL STEROID INJECTION INTO LUMBAR SPINE N/A 04/04/2022    Procedure: Injection-steroid-epidural-lumbar;  Surgeon: Chet Neal MD;  Location: [facility];  Service: Pain Management;  Laterality: N/A;    EPIDURAL STEROID INJECTION INTO LUMBAR SPINE N/A 08/08/2022    Procedure: Injection-steroid-epidural-lumbar;  Surgeon: Chet Neal MD;  Location: [facility];  Service: Pain Management;  Laterality: N/A;    INJECTION OF ANESTHETIC AGENT INTO SACROILIAC JOINT Left 01/24/2022    Procedure: BLOCK, SACROILIAC JOINT;  Surgeon: Chet Neal MD;  Location: [facility];  Service: Pain Management;  Laterality: Left;    INJECTION OF ANESTHETIC AGENT INTO SACROILIAC JOINT Right 10/12/2022    Procedure: BLOCK, SACROILIAC JOINT;  Surgeon: Chet Neal MD;  Location: [facility];  Service: Pain Management;  Laterality: Right;    INJECTION, SACROILIAC JOINT Bilateral 2/3/2025    Procedure: INJECTION,SACROILIAC JOINT;  Surgeon: Chet Neal MD;  Location: [facility] PERIOP PROCEDURES;  Service: Pain Management;  Laterality: Bilateral;    RADIOFREQUENCY ABLATION Bilateral 12/19/2022    Procedure: Radiofrequency Ablation sacral lateral branches;  Surgeon: Chet Neal MD;  Location: [facility];  Service: Pain Management;  Laterality: Bilateral;    RADIOFREQUENCY ABLATION Bilateral 11/8/2023    Procedure: Radiofrequency Ablation L5-S3 sacral branches;  Surgeon: Chet Neal MD;  Location: [facility];  Service: Pain Management;  Laterality: Bilateral;    RADIOFREQUENCY ABLATION OF LUMBAR MEDIAL BRANCH NERVE AT SINGLE LEVEL Bilateral 12/11/2024    Procedure: Radiofrequency Ablation, Nerve,  Spinal, Lumbar, Medial Branch, 1 Level;  Surgeon: Chet Neal MD;  Location: [facility] PERIOP PROCEDURES;  Service: Pain Management;  Laterality: Bilateral;  L5-S2    RADIOFREQUENCY ABLATION, NERVE, SPINAL, LUMBOSACRAL Bilateral 6/12/2024    Procedure: RADIOFREQUENCY ABLATION, NERVE, SPINAL, LUMBOSACRAL, EACH ADD'L LEVEL;  Surgeon: Chet Neal MD;  Location: [facility] MAIN OR;  Service: Pain Management;  Laterality: Bilateral;  L5-S3       Family History   Problem Relation Name Age of Onset    Lung cancer Father         Social History     Socioeconomic History    Marital status: Single   Tobacco Use    Smoking status: Never    Smokeless tobacco: Never   Substance and Sexual Activity    Alcohol use: Yes     Alcohol/week: 1.0 standard drink of alcohol     Types: 1 Glasses of wine per week     Comment: occasional    Drug use: Yes     Types: Marijuana    Sexual activity: Not Currently     Birth control/protection: None     Social Drivers of Health     Financial Resource Strain: Medium Risk (6/17/2024)    Overall Financial Resource Strain (CARDIA)     Difficulty of Paying Living Expenses: Somewhat hard   Food Insecurity: Food Insecurity Present (6/17/2024)    Hunger Vital Sign     Worried About Running Out of Food in the Last Year: Never true     Ran Out of Food in the Last Year: Sometimes true   Transportation Needs: No Transportation Needs (6/17/2024)    TRANSPORTATION NEEDS     Transportation : No   Physical Activity: Insufficiently Active (6/17/2024)    Exercise Vital Sign     Days of Exercise per Week: 2 days     Minutes of Exercise per Session: 60 min   Stress: No Stress Concern Present (6/17/2024)    [employer] - Occupational Stress Questionnaire     Feeling of Stress : Not at all   Housing Stability: High Risk (6/17/2024)    Housing Stability Vital Sign     Unable to Pay for Housing in the Last Year: Yes     Homeless in the Last Year: No       Review of patient's allergies  indicates:  No Known Allergies    OBJECTIVE:   Vital Signs:  There were no vitals filed for this visit.        Recent Results (from the past 24 hours)   Lipid Panel    Collection Time: 05/12/25  8:30 AM   Result Value Ref Range    Cholesterol Total 197 <=200 mg/dL    HDL Cholesterol 67 (H) 35 - 60 mg/dL    Triglyceride 75 37 - 140 mg/dL    Cholesterol/HDL Ratio 3 0 - 5    Very Low Density Lipoprotein 15     LDL Cholesterol 115.00 50.00 - 140.00 mg/dL   Iron and TIBC    Collection Time: 05/12/25  8:30 AM   Result Value Ref Range    Iron Binding Capacity Unsaturated 211 70 - 310 ug/dL    Iron Level 89 50 - 170 ug/dL    Transferrin 277 180 - 382 mg/dL    Iron Binding Capacity Total 300 250 - 450 ug/dL    Iron Saturation 30 20 - 50 %   CBC with Differential    Collection Time: 05/12/25  8:30 AM   Result Value Ref Range    WBC 7.50 4.50 - 11.50 x10(3)/mcL    RBC 4.63 4.20 - 5.40 x10(6)/mcL    Hgb 14.1 12.0 - 16.0 g/dL    Hct 43.3 37.0 - 47.0 %    MCV 93.5 80.0 - 94.0 fL    MCH 30.5 27.0 - 31.0 pg    MCHC 32.6 (L) 33.0 - 36.0 g/dL    RDW 12.9 11.5 - 17.0 %    Platelet 225 130 - 400 x10(3)/mcL    MPV 10.4 7.4 - 10.4 fL    Neut % 43.4 %    Lymph % 46.7 %    Mono % 6.1 %    Eos % 2.5 %    Basophil % 0.9 %    Imm Grans % 0.4 %    Neut # 3.25 2.1 - 9.2 x10(3)/mcL    Lymph # 3.50 0.6 - 4.6 x10(3)/mcL    Mono # 0.46 0.1 - 1.3 x10(3)/mcL    Eos # 0.19 0 - 0.9 x10(3)/mcL    Baso # 0.07 <=0.2 x10(3)/mcL    Imm Gran # 0.03 0.00 - 0.04 x10(3)/mcL    NRBC% 0.0 %         Physical Exam  Constitutional:       Appearance: Normal appearance. She is normal weight.   HENT:      Head: Normocephalic and atraumatic.      Nose: Nose normal.      Mouth/Throat:      Mouth: Mucous membranes are dry.   Eyes:      Extraocular Movements: Extraocular movements intact.      Conjunctiva/sclera: Conjunctivae normal.      Pupils: Pupils are equal, round, and reactive to light.   Cardiovascular:      Rate and Rhythm: Normal rate and regular rhythm.       Pulses: Normal pulses.      Heart sounds: Normal heart sounds.   Pulmonary:      Effort: Pulmonary effort is normal.      Breath sounds: Normal breath sounds.   Abdominal:      General: Abdomen is flat.      Palpations: Abdomen is soft.   Musculoskeletal:         General: Normal range of motion.      Cervical back: Normal range of motion.   Skin:     General: Skin is warm.      Capillary Refill: Capillary refill takes less than 2 seconds.   Neurological:      General: No focal deficit present.      Mental Status: She is alert and oriented to person, place, and time.   Psychiatric:         Mood and Affect: Mood normal.         Behavior: Behavior normal.     Laboratory  CMP:   Recent Labs   Lab 10/05/22  1700 11/23/22  0706 06/17/24  0926   Sodium 141 142 139   Potassium 3.5 4.3 4.3   CO2 25 23 23   Blood Urea Nitrogen 18.7 16.6 16.9   Creatinine 0.93 0.88 0.92   Calcium 9.5 9.4 9.8   Albumin 4.3 4.1 4.0   Bilirubin Total 0.9 0.9 0.9   AST 20 22 22   ALT 17 19 20   ALP 70 64 56     CBC:   Recent Labs   Lab 11/23/22  0706 06/17/24  0926 05/12/25  0830   WBC 7.7 8.75 7.50   Neut # 3.7 4.33 3.25   RBC 4.77 4.70 4.63   Hgb 14.4 14.7 14.1   Hct 44.2 43.4 43.3   Platelet 237 235 225   MCV 92.7 92.3 93.5   RDW 11.9 12.2 12.9     FLP:   Recent Labs   Lab 06/19/23  0840 06/17/24  0926 05/12/25  0830   Cholesterol Total 211 H 211 H 197   HDL Cholesterol 57 59 67 H   LDL Cholesterol 130.00 124.00 115.00   Triglyceride 119 138 75     DM:   Recent Labs   Lab 10/05/22  1700 11/23/22  0706 06/17/24  0926   Hemoglobin A1c  --  5.3 5.3   Creatinine 0.93 0.88 0.92     Thyroid:   Recent Labs   Lab 06/17/24  0926   TSH 2.378   Thyroxine Free 0.78     Anemia:   Recent Labs   Lab 05/12/25  0830   Hgb 14.1   Hct 43.3   Iron Level 89   Iron Binding Capacity Total 300             ASSESSMENT & PLAN:     Hx of vasomotor symptoms  followed by GYN, last seen by GYN on 7/2022 - prescribed:  -     estradioL (ESTRACE) 0.5 MG tablet; Take 1 tablet  (0.5 mg total) by mouth once daily.  -     medroxyPROGESTERone (PROVERA) 2.5 MG tablet; Take 1 tablet (2.5 mg total) by mouth once daily.    Hypercholesterolemia  Repeat Lipid panel 6/17/2024, ,   ASCVD risk 1.7% no statin therapy recommended at last visit      Chronic lower lumbar radiculopathy  Sciatic nerve pain  - XR lumbar spine done 12/2020 no listhesis, Possible L5-S1 facet overgrowth. Lumbar disc spaces are preserved.  - XR pelvis showed degenerative changes; ESR, CRP, SORAYA, HLA-B27 all negative  - continue gabapentin 600mg TID  - follows with neurosurgery in [facility] - s/p Radiofrequency Ablation sacral lateral branches (Bilateral) L5-S3 on 12/19/2022    Obesity  - Trial Mounjaro 2.5 weekly, advised on side effects and risks, however was unable to obtain it  - no hx of pancreatic issues, thyroid issues    Vit D deficiency  -vit D level increased to now 30.7 -> 27.2   -Rx for vit D3 1000IU daily - to continue (in addition she is taking multivitamin with 800U daily OTC which I instructed her to continue as well)    Health Maintenance Due   Topic Date Due    Pneumococcal Vaccines (Age 50+) (1 of 1 - PCV) Never done    COVID-19 Vaccine (1 - 2024-25 season) Never done           Health Maintenance/ Wellness  TDAP: UTD  Zoster: UTD  Influenza: refused  HIV/Hep screening: negative on 12/2020  CAGE screening: done 8.14.20  Influenza vaccine: pt refused   Screening colonoscopy: normal colonoscopy on 3/29/2021  Pap smear: 7/2022 done, repeat 7/2025  Mammogram: MG: BI-RADS1, negative 10/30/24, repeat 1 year    RTC 1 year    Future Appointments     Future Appointments   Date Time Provider Department Center   5/12/2025 10:10 AM Ty Hager MD [facility] IM RES [place]   5/20/2025 11:00 AM Chet Neal MD [facility]   8/28/2025  9:50 AM Danya Tesfaye, ANP [facility] GYN [place]      No orders of the defined types were placed in this encounter.        Discussed with Dr. Martinez - Staff  Attestation to Follow    Ty Hager MD  [facility] Internal Medicine PGY-3

## 2025-08-11 ENCOUNTER — HOSPITAL ENCOUNTER (OUTPATIENT)
Dept: RADIOLOGY | Facility: HOSPITAL | Age: 60
Discharge: HOME | End: 2025-08-11
Attending: STUDENT IN AN ORGANIZED HEALTH CARE EDUCATION/TRAINING PROGRAM
Payer: MEDICAID

## 2025-08-11 DIAGNOSIS — M54.16 LUMBAR RADICULOPATHY, CHRONIC: ICD-10-CM

## 2025-08-11 PROCEDURE — 72148 MRI LUMBAR SPINE W/O DYE: CPT | Mod: TC

## 2025-08-25 ENCOUNTER — DOCUMENTATION ONLY (OUTPATIENT)
Dept: INTERNAL MEDICINE | Facility: CLINIC | Age: 60
End: 2025-08-25
Payer: MEDICAID

## 2025-08-28 ENCOUNTER — OFFICE VISIT (OUTPATIENT)
Dept: GYNECOLOGY | Facility: CLINIC | Age: 60
End: 2025-08-28
Payer: MEDICAID

## 2025-08-28 VITALS
SYSTOLIC BLOOD PRESSURE: 124 MMHG | DIASTOLIC BLOOD PRESSURE: 81 MMHG | TEMPERATURE: 99 F | BODY MASS INDEX: 33.3 KG/M2 | HEIGHT: 61 IN | WEIGHT: 176.38 LBS | HEART RATE: 60 BPM | OXYGEN SATURATION: 100 % | RESPIRATION RATE: 20 BRPM

## 2025-08-28 DIAGNOSIS — Z12.31 VISIT FOR SCREENING MAMMOGRAM: ICD-10-CM

## 2025-08-28 DIAGNOSIS — Z12.4 PAP SMEAR FOR CERVICAL CANCER SCREENING: Primary | ICD-10-CM

## 2025-08-28 PROCEDURE — 99213 OFFICE O/P EST LOW 20 MIN: CPT | Mod: PBBFAC | Performed by: NURSE PRACTITIONER
